# Patient Record
Sex: FEMALE | Race: WHITE | NOT HISPANIC OR LATINO | Employment: UNEMPLOYED | URBAN - NONMETROPOLITAN AREA
[De-identification: names, ages, dates, MRNs, and addresses within clinical notes are randomized per-mention and may not be internally consistent; named-entity substitution may affect disease eponyms.]

---

## 2018-08-17 ENCOUNTER — HOSPITAL ENCOUNTER (EMERGENCY)
Facility: HOSPITAL | Age: 28
Discharge: HOME | End: 2018-08-18
Attending: EMERGENCY MEDICINE | Admitting: EMERGENCY MEDICINE

## 2018-08-17 DIAGNOSIS — N30.00 ACUTE CYSTITIS WITHOUT HEMATURIA: ICD-10-CM

## 2018-08-17 DIAGNOSIS — A59.9 TRICHOMONIASIS: Primary | ICD-10-CM

## 2018-08-17 LAB
B-HCG UR QL: NEGATIVE
BACTERIA UR QL AUTO: ABNORMAL /HPF
BASOPHILS # BLD AUTO: 0.04 10*3/MM3 (ref 0–0.3)
BASOPHILS NFR BLD AUTO: 0.4 % (ref 0–2)
BILIRUB UR QL STRIP: NEGATIVE
CLARITY UR: ABNORMAL
COLOR UR: ABNORMAL
DEPRECATED RDW RBC AUTO: 44.5 FL (ref 37–54)
EOSINOPHIL # BLD AUTO: 0.25 10*3/MM3 (ref 0–0.7)
EOSINOPHIL NFR BLD AUTO: 2.3 % (ref 0–5)
ERYTHROCYTE [DISTWIDTH] IN BLOOD BY AUTOMATED COUNT: 14.1 % (ref 11.5–14.5)
GLUCOSE UR STRIP-MCNC: NEGATIVE MG/DL
HCT VFR BLD AUTO: 39.3 % (ref 37–47)
HGB BLD-MCNC: 12.6 G/DL (ref 12–16)
HGB UR QL STRIP.AUTO: NEGATIVE
HYALINE CASTS UR QL AUTO: ABNORMAL /LPF
IMM GRANULOCYTES # BLD: 0.03 10*3/MM3 (ref 0–0.03)
IMM GRANULOCYTES NFR BLD: 0.3 % (ref 0–0.5)
KETONES UR QL STRIP: NEGATIVE
LEUKOCYTE ESTERASE UR QL STRIP.AUTO: ABNORMAL
LYMPHOCYTES # BLD AUTO: 2.97 10*3/MM3 (ref 1–3)
LYMPHOCYTES NFR BLD AUTO: 27.5 % (ref 21–51)
MCH RBC QN AUTO: 28.4 PG (ref 27–33)
MCHC RBC AUTO-ENTMCNC: 32.1 G/DL (ref 33–37)
MCV RBC AUTO: 88.5 FL (ref 80–94)
MONOCYTES # BLD AUTO: 0.72 10*3/MM3 (ref 0.1–0.9)
MONOCYTES NFR BLD AUTO: 6.7 % (ref 0–10)
NEUTROPHILS # BLD AUTO: 6.79 10*3/MM3 (ref 1.4–6.5)
NEUTROPHILS NFR BLD AUTO: 62.8 % (ref 30–70)
NITRITE UR QL STRIP: NEGATIVE
PH UR STRIP.AUTO: 5.5 [PH] (ref 5–8)
PLATELET # BLD AUTO: 242 10*3/MM3 (ref 130–400)
PMV BLD AUTO: 9.6 FL (ref 6–10)
PROT UR QL STRIP: NEGATIVE
RBC # BLD AUTO: 4.44 10*6/MM3 (ref 4.2–5.4)
RBC # UR: ABNORMAL /HPF
REF LAB TEST METHOD: ABNORMAL
SP GR UR STRIP: 1.03 (ref 1–1.03)
SQUAMOUS #/AREA URNS HPF: ABNORMAL /HPF
UROBILINOGEN UR QL STRIP: ABNORMAL
WBC NRBC COR # BLD: 10.8 10*3/MM3 (ref 4.5–12.5)
WBC UR QL AUTO: ABNORMAL /HPF

## 2018-08-17 PROCEDURE — 81001 URINALYSIS AUTO W/SCOPE: CPT | Performed by: PHYSICIAN ASSISTANT

## 2018-08-17 PROCEDURE — 87086 URINE CULTURE/COLONY COUNT: CPT | Performed by: PHYSICIAN ASSISTANT

## 2018-08-17 PROCEDURE — 36415 COLL VENOUS BLD VENIPUNCTURE: CPT

## 2018-08-17 PROCEDURE — 87220 TISSUE EXAM FOR FUNGI: CPT | Performed by: PHYSICIAN ASSISTANT

## 2018-08-17 PROCEDURE — 85025 COMPLETE CBC W/AUTO DIFF WBC: CPT | Performed by: PHYSICIAN ASSISTANT

## 2018-08-17 PROCEDURE — 99284 EMERGENCY DEPT VISIT MOD MDM: CPT

## 2018-08-17 PROCEDURE — 87210 SMEAR WET MOUNT SALINE/INK: CPT | Performed by: PHYSICIAN ASSISTANT

## 2018-08-17 PROCEDURE — 87491 CHLMYD TRACH DNA AMP PROBE: CPT | Performed by: PHYSICIAN ASSISTANT

## 2018-08-17 PROCEDURE — 87591 N.GONORRHOEAE DNA AMP PROB: CPT | Performed by: PHYSICIAN ASSISTANT

## 2018-08-17 PROCEDURE — 80053 COMPREHEN METABOLIC PANEL: CPT | Performed by: PHYSICIAN ASSISTANT

## 2018-08-17 PROCEDURE — 81025 URINE PREGNANCY TEST: CPT | Performed by: PHYSICIAN ASSISTANT

## 2018-08-18 ENCOUNTER — APPOINTMENT (OUTPATIENT)
Dept: ULTRASOUND IMAGING | Facility: HOSPITAL | Age: 28
End: 2018-08-18

## 2018-08-18 VITALS
TEMPERATURE: 98.1 F | HEART RATE: 86 BPM | BODY MASS INDEX: 19.7 KG/M2 | SYSTOLIC BLOOD PRESSURE: 126 MMHG | OXYGEN SATURATION: 98 % | HEIGHT: 68 IN | RESPIRATION RATE: 18 BRPM | WEIGHT: 130 LBS | DIASTOLIC BLOOD PRESSURE: 69 MMHG

## 2018-08-18 LAB
ALBUMIN SERPL-MCNC: 4.2 G/DL (ref 3.5–5)
ALBUMIN/GLOB SERPL: 1.3 G/DL (ref 1.5–2.5)
ALP SERPL-CCNC: 48 U/L (ref 35–104)
ALT SERPL W P-5'-P-CCNC: 22 U/L (ref 10–36)
ANION GAP SERPL CALCULATED.3IONS-SCNC: 5.9 MMOL/L (ref 3.6–11.2)
AST SERPL-CCNC: 28 U/L (ref 10–30)
BILIRUB SERPL-MCNC: 0.4 MG/DL (ref 0.2–1.8)
BUN BLD-MCNC: 16 MG/DL (ref 7–21)
BUN/CREAT SERPL: 19.5 (ref 7–25)
C TRACH RRNA CVX QL NAA+PROBE: NOT DETECTED
CALCIUM SPEC-SCNC: 9 MG/DL (ref 7.7–10)
CHLORIDE SERPL-SCNC: 105 MMOL/L (ref 99–112)
CO2 SERPL-SCNC: 28.1 MMOL/L (ref 24.3–31.9)
CREAT BLD-MCNC: 0.82 MG/DL (ref 0.43–1.29)
GFR SERPL CREATININE-BSD FRML MDRD: 83 ML/MIN/1.73
GLOBULIN UR ELPH-MCNC: 3.3 GM/DL
GLUCOSE BLD-MCNC: 71 MG/DL (ref 70–110)
HYDATID CYST SPEC WET PREP: ABNORMAL
KOH PREP NAIL: NORMAL
N GONORRHOEA RRNA SPEC QL NAA+PROBE: NOT DETECTED
OSMOLALITY SERPL CALC.SUM OF ELEC: 277.2 MOSM/KG (ref 273–305)
POTASSIUM BLD-SCNC: 4.1 MMOL/L (ref 3.5–5.3)
PROT SERPL-MCNC: 7.5 G/DL (ref 6–8)
SODIUM BLD-SCNC: 139 MMOL/L (ref 135–153)
T VAGINALIS SPEC QL WET PREP: ABNORMAL
WBC SPEC QL WET PREP: ABNORMAL
YEAST GENITAL QL WET PREP: ABNORMAL

## 2018-08-18 PROCEDURE — 96372 THER/PROPH/DIAG INJ SC/IM: CPT

## 2018-08-18 PROCEDURE — 76830 TRANSVAGINAL US NON-OB: CPT | Performed by: RADIOLOGY

## 2018-08-18 PROCEDURE — 25010000002 CEFTRIAXONE PER 250 MG: Performed by: PHYSICIAN ASSISTANT

## 2018-08-18 PROCEDURE — 76830 TRANSVAGINAL US NON-OB: CPT

## 2018-08-18 RX ORDER — METRONIDAZOLE 250 MG/1
2000 TABLET ORAL ONCE
Status: COMPLETED | OUTPATIENT
Start: 2018-08-18 | End: 2018-08-18

## 2018-08-18 RX ORDER — CEFTRIAXONE 1 G/1
1000 INJECTION, POWDER, FOR SOLUTION INTRAMUSCULAR; INTRAVENOUS ONCE
Status: COMPLETED | OUTPATIENT
Start: 2018-08-18 | End: 2018-08-18

## 2018-08-18 RX ORDER — NITROFURANTOIN 25; 75 MG/1; MG/1
100 CAPSULE ORAL 2 TIMES DAILY
Qty: 14 CAPSULE | Refills: 0 | Status: SHIPPED | OUTPATIENT
Start: 2018-08-18 | End: 2018-08-25

## 2018-08-18 RX ORDER — LIDOCAINE HYDROCHLORIDE 10 MG/ML
2.1 INJECTION, SOLUTION EPIDURAL; INFILTRATION; INTRACAUDAL; PERINEURAL ONCE
Status: COMPLETED | OUTPATIENT
Start: 2018-08-18 | End: 2018-08-18

## 2018-08-18 RX ADMIN — METRONIDAZOLE 2000 MG: 250 TABLET ORAL at 01:29

## 2018-08-18 RX ADMIN — LIDOCAINE HYDROCHLORIDE 2.1 ML: 10 INJECTION, SOLUTION EPIDURAL; INFILTRATION; INTRACAUDAL; PERINEURAL at 01:32

## 2018-08-18 RX ADMIN — CEFTRIAXONE 1000 MG: 1 INJECTION, POWDER, FOR SOLUTION INTRAMUSCULAR; INTRAVENOUS at 01:32

## 2018-08-18 NOTE — DISCHARGE INSTRUCTIONS
Please complete your antibiotic regimen and call Dr. Grimm's office on Monday to schedule an appointment to remove malpositioned IUD. Please return to ER if symptoms worsen.

## 2018-08-18 NOTE — ED PROVIDER NOTES
Subjective   Patient says vaginal bleeding started yesterday and that she felt the plastic part of her IUD and pushed it back into her vagina.         Vaginal Discharge   Quality:  Bloody and mucoid  Severity:  Moderate  Onset quality:  Gradual  Duration:  1 day  Timing:  Intermittent  Progression:  Waxing and waning  Chronicity:  New  Context: during urination and spontaneously    Context: not after intercourse, not after urination, not at rest, not during bowel movement, not during intercourse, not during pregnancy, not genital trauma and not recent antibiotic use    Relieved by:  None tried  Worsened by:  Nothing  Ineffective treatments:  None tried  Associated symptoms: abdominal pain    Associated symptoms: no dyspareunia, no dysuria, no fever, no genital lesions, no nausea, no rash, no urinary frequency, no urinary hesitancy, no urinary incontinence, no vaginal itching and no vomiting    Risk factors: no endometriosis, no foreign body, no gynecological surgery, no immunosuppression, no new sexual partner, no PID, no prior miscarriage, no STI, no STI exposure, no terminated pregnancy and no unprotected sex        Review of Systems   Constitutional: Negative.  Negative for fever.   HENT: Negative.    Respiratory: Negative.    Cardiovascular: Negative.  Negative for chest pain.   Gastrointestinal: Positive for abdominal pain. Negative for abdominal distention, anal bleeding, blood in stool, constipation, diarrhea, nausea, rectal pain and vomiting.   Endocrine: Negative.    Genitourinary: Positive for vaginal bleeding and vaginal discharge. Negative for bladder incontinence, decreased urine volume, difficulty urinating, dyspareunia, dysuria, enuresis, flank pain, frequency, genital sores, hematuria, hesitancy, menstrual problem, pelvic pain, urgency and vaginal pain.   Skin: Negative.    Neurological: Negative.    Psychiatric/Behavioral: Negative.    All other systems reviewed and are negative.      Past Medical  History:   Diagnosis Date   • Lupus    • Substance abuse        No Known Allergies    Past Surgical History:   Procedure Laterality Date   • ADENOIDECTOMY     • APPENDECTOMY     • CHOLECYSTECTOMY     • TONSILLECTOMY     • TUBAL ABDOMINAL LIGATION         No family history on file.    Social History     Social History   • Marital status:      Social History Main Topics   • Smoking status: Current Every Day Smoker     Packs/day: 3.00   • Alcohol use No   • Drug use: No     Other Topics Concern   • Not on file           Objective   Physical Exam   Constitutional: She is oriented to person, place, and time. She appears well-developed and well-nourished. No distress.   HENT:   Head: Normocephalic and atraumatic.   Right Ear: External ear normal.   Left Ear: External ear normal.   Nose: Nose normal.   Eyes: Pupils are equal, round, and reactive to light. Conjunctivae and EOM are normal.   Neck: Normal range of motion. Neck supple. No JVD present. No tracheal deviation present.   Cardiovascular: Normal rate, regular rhythm and normal heart sounds.  Exam reveals no gallop and no friction rub.    No murmur heard.  Pulmonary/Chest: Effort normal and breath sounds normal. No respiratory distress. She has no wheezes. She has no rales. She exhibits no tenderness.   Abdominal: Soft. Bowel sounds are normal. She exhibits no distension and no mass. There is no tenderness. There is no rebound and no guarding. No hernia.   Genitourinary: Uterus normal. Vaginal discharge found.   Genitourinary Comments: Thick, white vaginal discharge noted. No bleeding. IUD not seen.    Musculoskeletal: Normal range of motion. She exhibits no edema or deformity.   Neurological: She is alert and oriented to person, place, and time. No cranial nerve deficit.   Skin: Skin is warm and dry. No rash noted. She is not diaphoretic. No erythema. No pallor.   Psychiatric: She has a normal mood and affect. Her behavior is normal. Thought content normal.    Nursing note and vitals reviewed.      Procedures           ED Course  ED Course as of Aug 18 0145   Sat Aug 18, 2018   0130 IUD malpositioning with endocervical region and partially embedded within uterine body with no gross penetration through uterine wall; right ovarian cyst per [facility] report.  US Non-ob Transvaginal [MM]   0141 Patient diagnosed with malpositioned IUD, trichomoniasis, and UTI. Will be d/c home with rx for macrobid and instructions to f/u with GYN on Monday to have IUD removed. Will return to ER if symptoms worsen.   [MM]      ED Course User Index  [MM] Aurea Franklin PA                  MDM  Number of Diagnoses or Management Options  Acute cystitis without hematuria:   Trichomoniasis:      Amount and/or Complexity of Data Reviewed  Clinical lab tests: ordered and reviewed  Tests in the radiology section of CPT®: ordered and reviewed  Discuss the patient with other providers: yes          Final diagnoses:   Trichomoniasis   Acute cystitis without hematuria            Aurea Franklin PA  08/18/18 0145

## 2018-08-20 LAB — BACTERIA SPEC AEROBE CULT: NORMAL

## 2020-02-11 PROCEDURE — 99283 EMERGENCY DEPT VISIT LOW MDM: CPT

## 2020-02-12 ENCOUNTER — HOSPITAL ENCOUNTER (EMERGENCY)
Facility: HOSPITAL | Age: 30
Discharge: HOME | End: 2020-02-12
Attending: EMERGENCY MEDICINE | Admitting: EMERGENCY MEDICINE

## 2020-02-12 ENCOUNTER — APPOINTMENT (OUTPATIENT)
Dept: ULTRASOUND IMAGING | Facility: HOSPITAL | Age: 30
End: 2020-02-12

## 2020-02-12 VITALS
HEART RATE: 72 BPM | TEMPERATURE: 97.8 F | WEIGHT: 172.2 LBS | HEIGHT: 68 IN | DIASTOLIC BLOOD PRESSURE: 90 MMHG | SYSTOLIC BLOOD PRESSURE: 128 MMHG | RESPIRATION RATE: 18 BRPM | OXYGEN SATURATION: 100 % | BODY MASS INDEX: 26.1 KG/M2

## 2020-02-12 DIAGNOSIS — R10.9 ABDOMINAL PAIN DURING PREGNANCY IN FIRST TRIMESTER: Primary | ICD-10-CM

## 2020-02-12 DIAGNOSIS — O26.891 ABDOMINAL PAIN DURING PREGNANCY IN FIRST TRIMESTER: Primary | ICD-10-CM

## 2020-02-12 LAB
6-ACETYL MORPHINE: NEGATIVE
ALBUMIN SERPL-MCNC: 3.87 G/DL (ref 3.5–5.2)
ALBUMIN/GLOB SERPL: 1.1 G/DL
ALP SERPL-CCNC: 48 U/L (ref 39–117)
ALT SERPL W P-5'-P-CCNC: 12 U/L (ref 1–33)
AMPHET+METHAMPHET UR QL: NEGATIVE
ANION GAP SERPL CALCULATED.3IONS-SCNC: 11.5 MMOL/L (ref 5–15)
AST SERPL-CCNC: 16 U/L (ref 1–32)
BACTERIA UR QL AUTO: ABNORMAL /HPF
BARBITURATES UR QL SCN: NEGATIVE
BASOPHILS # BLD AUTO: 0.04 10*3/MM3 (ref 0–0.2)
BASOPHILS NFR BLD AUTO: 0.4 % (ref 0–1.5)
BENZODIAZ UR QL SCN: NEGATIVE
BILIRUB SERPL-MCNC: 0.2 MG/DL (ref 0.2–1.2)
BILIRUB UR QL STRIP: NEGATIVE
BUN BLD-MCNC: 8 MG/DL (ref 6–20)
BUN/CREAT SERPL: 15.1 (ref 7–25)
BUPRENORPHINE SERPL-MCNC: NEGATIVE NG/ML
CALCIUM SPEC-SCNC: 9 MG/DL (ref 8.6–10.5)
CANNABINOIDS SERPL QL: NEGATIVE
CHLORIDE SERPL-SCNC: 98 MMOL/L (ref 98–107)
CLARITY UR: CLEAR
CO2 SERPL-SCNC: 24.5 MMOL/L (ref 22–29)
COCAINE UR QL: NEGATIVE
COLOR UR: YELLOW
CREAT BLD-MCNC: 0.53 MG/DL (ref 0.57–1)
DEPRECATED RDW RBC AUTO: 44.8 FL (ref 37–54)
EOSINOPHIL # BLD AUTO: 0 10*3/MM3 (ref 0–0.4)
EOSINOPHIL NFR BLD AUTO: 0 % (ref 0.3–6.2)
ERYTHROCYTE [DISTWIDTH] IN BLOOD BY AUTOMATED COUNT: 13.8 % (ref 12.3–15.4)
GFR SERPL CREATININE-BSD FRML MDRD: 136 ML/MIN/1.73
GLOBULIN UR ELPH-MCNC: 3.6 GM/DL
GLUCOSE BLD-MCNC: 94 MG/DL (ref 65–99)
GLUCOSE UR STRIP-MCNC: NEGATIVE MG/DL
HCG INTACT+B SERPL-ACNC: NORMAL MIU/ML
HCT VFR BLD AUTO: 38.2 % (ref 34–46.6)
HGB BLD-MCNC: 12.2 G/DL (ref 12–15.9)
HGB UR QL STRIP.AUTO: NEGATIVE
HOLD SPECIMEN: NORMAL
HOLD SPECIMEN: NORMAL
HYALINE CASTS UR QL AUTO: ABNORMAL /LPF
IMM GRANULOCYTES # BLD AUTO: 0.07 10*3/MM3 (ref 0–0.05)
IMM GRANULOCYTES NFR BLD AUTO: 0.7 % (ref 0–0.5)
KETONES UR QL STRIP: NEGATIVE
LEUKOCYTE ESTERASE UR QL STRIP.AUTO: ABNORMAL
LIPASE SERPL-CCNC: 56 U/L (ref 13–60)
LYMPHOCYTES # BLD AUTO: 2.02 10*3/MM3 (ref 0.7–3.1)
LYMPHOCYTES NFR BLD AUTO: 20.9 % (ref 19.6–45.3)
MCH RBC QN AUTO: 28.2 PG (ref 26.6–33)
MCHC RBC AUTO-ENTMCNC: 31.9 G/DL (ref 31.5–35.7)
MCV RBC AUTO: 88.2 FL (ref 79–97)
METHADONE UR QL SCN: NEGATIVE
MONOCYTES # BLD AUTO: 0.6 10*3/MM3 (ref 0.1–0.9)
MONOCYTES NFR BLD AUTO: 6.2 % (ref 5–12)
NEUTROPHILS # BLD AUTO: 6.92 10*3/MM3 (ref 1.7–7)
NEUTROPHILS NFR BLD AUTO: 71.8 % (ref 42.7–76)
NITRITE UR QL STRIP: NEGATIVE
NRBC BLD AUTO-RTO: 0 /100 WBC (ref 0–0.2)
OPIATES UR QL: NEGATIVE
OXYCODONE UR QL SCN: NEGATIVE
PCP UR QL SCN: NEGATIVE
PH UR STRIP.AUTO: 7 [PH] (ref 5–8)
PLATELET # BLD AUTO: 269 10*3/MM3 (ref 140–450)
PMV BLD AUTO: 9.6 FL (ref 6–12)
POTASSIUM BLD-SCNC: 3.7 MMOL/L (ref 3.5–5.2)
PROT SERPL-MCNC: 7.5 G/DL (ref 6–8.5)
PROT UR QL STRIP: NEGATIVE
RBC # BLD AUTO: 4.33 10*6/MM3 (ref 3.77–5.28)
RBC # UR: ABNORMAL /HPF
REF LAB TEST METHOD: ABNORMAL
SODIUM BLD-SCNC: 134 MMOL/L (ref 136–145)
SP GR UR STRIP: <=1.005 (ref 1–1.03)
SQUAMOUS #/AREA URNS HPF: ABNORMAL /HPF
UROBILINOGEN UR QL STRIP: ABNORMAL
WBC NRBC COR # BLD: 9.65 10*3/MM3 (ref 3.4–10.8)
WBC UR QL AUTO: ABNORMAL /HPF
WHOLE BLOOD HOLD SPECIMEN: NORMAL
WHOLE BLOOD HOLD SPECIMEN: NORMAL

## 2020-02-12 PROCEDURE — 84702 CHORIONIC GONADOTROPIN TEST: CPT | Performed by: PHYSICIAN ASSISTANT

## 2020-02-12 PROCEDURE — 81001 URINALYSIS AUTO W/SCOPE: CPT | Performed by: PHYSICIAN ASSISTANT

## 2020-02-12 PROCEDURE — 80307 DRUG TEST PRSMV CHEM ANLYZR: CPT | Performed by: PHYSICIAN ASSISTANT

## 2020-02-12 PROCEDURE — 76801 OB US < 14 WKS SINGLE FETUS: CPT

## 2020-02-12 PROCEDURE — 83690 ASSAY OF LIPASE: CPT | Performed by: PHYSICIAN ASSISTANT

## 2020-02-12 PROCEDURE — 80053 COMPREHEN METABOLIC PANEL: CPT | Performed by: PHYSICIAN ASSISTANT

## 2020-02-12 PROCEDURE — 85025 COMPLETE CBC W/AUTO DIFF WBC: CPT | Performed by: PHYSICIAN ASSISTANT

## 2020-02-12 RX ORDER — PNV NO.95/FERROUS FUM/FOLIC AC 28MG-0.8MG
1 TABLET ORAL DAILY
Qty: 30 TABLET | Refills: 0 | Status: SHIPPED | OUTPATIENT
Start: 2020-02-12 | End: 2020-03-13

## 2020-02-12 RX ORDER — SODIUM CHLORIDE 0.9 % (FLUSH) 0.9 %
10 SYRINGE (ML) INJECTION AS NEEDED
Status: DISCONTINUED | OUTPATIENT
Start: 2020-02-12 | End: 2020-02-12 | Stop reason: HOSPADM

## 2020-02-12 NOTE — ED NOTES
Pt noted to be eating strawberries in the lobby and drinking a beverage      Yemi Shaw  02/12/20 0005

## 2020-02-12 NOTE — ED PROVIDER NOTES
Subjective   30 y/o female pt presents to the ED with complaints of abdominal pain. Patient states that she has no idea how far along she is. States that she had her Mirena taken out recently.  Patient states she is having severe lower abd pain and tightness. Reports nausea and vomiting. Denies dhara fevers. States that this has been going on for 5 hours. Experiencing increased anxiety.        History provided by:  Patient   used: No        Review of Systems   Constitutional: Negative.    HENT: Negative.    Eyes: Negative.    Respiratory: Negative.    Cardiovascular: Negative.    Gastrointestinal: Positive for abdominal pain, nausea and vomiting.   Endocrine: Negative.    Genitourinary: Negative.    Musculoskeletal: Negative.    Skin: Negative.    Allergic/Immunologic: Negative.    Neurological: Negative.    Hematological: Negative.    Psychiatric/Behavioral: The patient is nervous/anxious.    All other systems reviewed and are negative.      Past Medical History:   Diagnosis Date   • Lupus (CMS/[facility])    • Substance abuse (CMS/[facility])        No Known Allergies    Past Surgical History:   Procedure Laterality Date   • ADENOIDECTOMY     • APPENDECTOMY     • CHOLECYSTECTOMY     • TONSILLECTOMY     • TUBAL ABDOMINAL LIGATION         No family history on file.    Social History     Socioeconomic History   • Marital status:      Spouse name: Not on file   • Number of children: Not on file   • Years of education: Not on file   • Highest education level: Not on file   Tobacco Use   • Smoking status: Current Every Day Smoker     Packs/day: 3.00   Substance and Sexual Activity   • Alcohol use: No   • Drug use: No           Objective   Physical Exam   Constitutional: She is oriented to person, place, and time. She appears well-developed and well-nourished. No distress.   HENT:   Head: Normocephalic and atraumatic.   Right Ear: External ear normal.   Left Ear: External ear normal.   Nose: Nose normal.    Mouth/Throat: Oropharynx is clear and moist.   Eyes: Pupils are equal, round, and reactive to light. Conjunctivae and EOM are normal.   Neck: Normal range of motion. Neck supple.   Cardiovascular: Normal rate, regular rhythm, normal heart sounds and intact distal pulses.   Pulmonary/Chest: Effort normal and breath sounds normal.   Abdominal: Soft. Bowel sounds are normal. She exhibits no distension and no mass. There is no tenderness. There is no rebound and no guarding. No hernia.   Musculoskeletal: Normal range of motion.   Neurological: She is alert and oriented to person, place, and time.   Skin: Skin is warm and dry. Capillary refill takes less than 2 seconds. She is not diaphoretic.   Psychiatric: She has a normal mood and affect. Her behavior is normal. Judgment and thought content normal.   Nursing note and vitals reviewed.      Procedures           ED Course  ED Course as of Feb 12 0246   Wed Feb 12, 2020   0207 IMPRESSION:     1. Single living intrauterine pregnancy with an estimated gestational age of approximately 11 weeks and 3 days.  2. Neither ovary visualized.      Signer Name: Jimbo Dill MD   Signed: 2/12/2020 2:01 AM   Workstation Name: [id number]    [facility] Ob < 14 Weeks Single or First Gestation [ML]   0245 Pt instructed to f/u with OBGYN and start prenatal vitamins. Discussed sx that would warrant return to the ED.     [ML]      ED Course User Index  [ML] Mague Johansen PA                                           MDM  Number of Diagnoses or Management Options  Abdominal pain during pregnancy in first trimester:      Amount and/or Complexity of Data Reviewed  Clinical lab tests: ordered and reviewed  Tests in the radiology section of CPT®: ordered and reviewed    Risk of Complications, Morbidity, and/or Mortality  Presenting problems: low  Diagnostic procedures: low  Management options: minimal    Patient Progress  Patient progress: improved      Final  diagnoses:   Abdominal pain during pregnancy in first trimester            Mague Johansen PA  02/12/20 2627

## 2020-02-12 NOTE — ED NOTES
Patient says she is unable to give urine specimen at this time due to recently voiding urine before she came back to her room.     Cody Grover  02/12/20 0022

## 2020-07-10 LAB
EXTERNAL ABO GROUPING: NORMAL
EXTERNAL ANTIBODY SCREEN: NEGATIVE
EXTERNAL CHLAMYDIA SCREEN: NEGATIVE
EXTERNAL GONORRHEA SCREEN: NEGATIVE
EXTERNAL HEPATITIS B SURFACE ANTIGEN: NEGATIVE
EXTERNAL HEPATITIS C AB: NORMAL
EXTERNAL RH FACTOR: POSITIVE
EXTERNAL RUBELLA QUALITATIVE: NORMAL
EXTERNAL SYPHILIS RPR SCREEN: NORMAL
HIV1 P24 AG SERPL QL IA: NORMAL

## 2020-08-22 ENCOUNTER — HOSPITAL ENCOUNTER (INPATIENT)
Facility: HOSPITAL | Age: 30
LOS: 2 days | Discharge: HOME | End: 2020-08-24
Attending: OBSTETRICS & GYNECOLOGY | Admitting: OBSTETRICS & GYNECOLOGY

## 2020-08-22 ENCOUNTER — ANESTHESIA (OUTPATIENT)
Dept: LABOR AND DELIVERY | Facility: HOSPITAL | Age: 30
End: 2020-08-22

## 2020-08-22 ENCOUNTER — ANESTHESIA EVENT (OUTPATIENT)
Dept: LABOR AND DELIVERY | Facility: HOSPITAL | Age: 30
End: 2020-08-22

## 2020-08-22 PROBLEM — Z34.90 PREGNANT: Status: RESOLVED | Noted: 2020-08-22 | Resolved: 2020-08-22

## 2020-08-22 PROBLEM — Z34.90 PREGNANT: Status: ACTIVE | Noted: 2020-08-22

## 2020-08-22 LAB
ABO GROUP BLD: NORMAL
AMPHET+METHAMPHET UR QL: NEGATIVE
BARBITURATES UR QL SCN: NEGATIVE
BENZODIAZ UR QL SCN: NEGATIVE
BLD GP AB SCN SERPL QL: NEGATIVE
CANNABINOIDS SERPL QL: NEGATIVE
COCAINE UR QL: NEGATIVE
DEPRECATED RDW RBC AUTO: 48.5 FL (ref 37–54)
ERYTHROCYTE [DISTWIDTH] IN BLOOD BY AUTOMATED COUNT: 15.5 % (ref 12.3–15.4)
HCT VFR BLD AUTO: 27.7 % (ref 34–46.6)
HGB BLD-MCNC: 8.4 G/DL (ref 12–15.9)
MCH RBC QN AUTO: 25.9 PG (ref 26.6–33)
MCHC RBC AUTO-ENTMCNC: 30.3 G/DL (ref 31.5–35.7)
MCV RBC AUTO: 85.5 FL (ref 79–97)
METHADONE UR QL SCN: NEGATIVE
OPIATES UR QL: NEGATIVE
OXYCODONE UR QL SCN: NEGATIVE
PLATELET # BLD AUTO: 270 10*3/MM3 (ref 140–450)
PMV BLD AUTO: 10.3 FL (ref 6–12)
RBC # BLD AUTO: 3.24 10*6/MM3 (ref 3.77–5.28)
RH BLD: POSITIVE
SARS-COV-2 RDRP RESP QL NAA+PROBE: NOT DETECTED
T&S EXPIRATION DATE: NORMAL
WBC # BLD AUTO: 11.61 10*3/MM3 (ref 3.4–10.8)

## 2020-08-22 PROCEDURE — 86901 BLOOD TYPING SEROLOGIC RH(D): CPT | Performed by: OBSTETRICS & GYNECOLOGY

## 2020-08-22 PROCEDURE — 25010000003 LIDOCAINE 1 % SOLUTION: Performed by: NURSE ANESTHETIST, CERTIFIED REGISTERED

## 2020-08-22 PROCEDURE — 59025 FETAL NON-STRESS TEST: CPT

## 2020-08-22 PROCEDURE — 25010000002 BUTORPHANOL PER 1 MG: Performed by: OBSTETRICS & GYNECOLOGY

## 2020-08-22 PROCEDURE — 80307 DRUG TEST PRSMV CHEM ANLYZR: CPT | Performed by: OBSTETRICS & GYNECOLOGY

## 2020-08-22 PROCEDURE — C1755 CATHETER, INTRASPINAL: HCPCS

## 2020-08-22 PROCEDURE — 25010000002 FENTANYL CITRATE (PF) 100 MCG/2ML SOLUTION: Performed by: NURSE ANESTHETIST, CERTIFIED REGISTERED

## 2020-08-22 PROCEDURE — 25010000003 AMPICILLIN PER 500 MG: Performed by: OBSTETRICS & GYNECOLOGY

## 2020-08-22 PROCEDURE — C1755 CATHETER, INTRASPINAL: HCPCS | Performed by: NURSE ANESTHETIST, CERTIFIED REGISTERED

## 2020-08-22 PROCEDURE — G0463 HOSPITAL OUTPT CLINIC VISIT: HCPCS

## 2020-08-22 PROCEDURE — 25010000002 AMPICILLIN PER 500 MG: Performed by: OBSTETRICS & GYNECOLOGY

## 2020-08-22 PROCEDURE — 87635 SARS-COV-2 COVID-19 AMP PRB: CPT | Performed by: OBSTETRICS & GYNECOLOGY

## 2020-08-22 PROCEDURE — 86850 RBC ANTIBODY SCREEN: CPT | Performed by: OBSTETRICS & GYNECOLOGY

## 2020-08-22 PROCEDURE — 86900 BLOOD TYPING SEROLOGIC ABO: CPT | Performed by: OBSTETRICS & GYNECOLOGY

## 2020-08-22 PROCEDURE — 25010000002 ROPIVACAINE PER 1 MG: Performed by: NURSE ANESTHETIST, CERTIFIED REGISTERED

## 2020-08-22 PROCEDURE — 85027 COMPLETE CBC AUTOMATED: CPT | Performed by: OBSTETRICS & GYNECOLOGY

## 2020-08-22 RX ORDER — ONDANSETRON 2 MG/ML
4 INJECTION INTRAMUSCULAR; INTRAVENOUS ONCE AS NEEDED
Status: DISCONTINUED | OUTPATIENT
Start: 2020-08-22 | End: 2020-08-22

## 2020-08-22 RX ORDER — SODIUM CHLORIDE, SODIUM LACTATE, POTASSIUM CHLORIDE, CALCIUM CHLORIDE 600; 310; 30; 20 MG/100ML; MG/100ML; MG/100ML; MG/100ML
125 INJECTION, SOLUTION INTRAVENOUS CONTINUOUS
Status: DISCONTINUED | OUTPATIENT
Start: 2020-08-22 | End: 2020-08-22

## 2020-08-22 RX ORDER — CETIRIZINE HYDROCHLORIDE 10 MG/1
10 TABLET ORAL DAILY PRN
Status: CANCELLED | OUTPATIENT
Start: 2020-08-22

## 2020-08-22 RX ORDER — DOCUSATE SODIUM 100 MG/1
100 CAPSULE, LIQUID FILLED ORAL 2 TIMES DAILY
Status: DISCONTINUED | OUTPATIENT
Start: 2020-08-22 | End: 2020-08-24 | Stop reason: HOSPADM

## 2020-08-22 RX ORDER — PSEUDOEPHEDRINE HCL 30 MG
30 TABLET ORAL EVERY 6 HOURS PRN
Status: ON HOLD | COMMUNITY
End: 2020-08-22

## 2020-08-22 RX ORDER — HYDROCORTISONE ACETATE PRAMOXINE HCL 1; 1 G/100G; G/100G
1 CREAM TOPICAL AS NEEDED
Status: DISCONTINUED | OUTPATIENT
Start: 2020-08-22 | End: 2020-08-24 | Stop reason: HOSPADM

## 2020-08-22 RX ORDER — HYDROCORTISONE 25 MG/G
1 CREAM TOPICAL AS NEEDED
Status: DISCONTINUED | OUTPATIENT
Start: 2020-08-22 | End: 2020-08-24 | Stop reason: HOSPADM

## 2020-08-22 RX ORDER — FAMOTIDINE 10 MG/ML
20 INJECTION, SOLUTION INTRAVENOUS ONCE AS NEEDED
Status: DISCONTINUED | OUTPATIENT
Start: 2020-08-22 | End: 2020-08-22

## 2020-08-22 RX ORDER — LIDOCAINE HYDROCHLORIDE 20 MG/ML
INJECTION, SOLUTION EPIDURAL; INFILTRATION; INTRACAUDAL; PERINEURAL
Status: COMPLETED
Start: 2020-08-22 | End: 2020-08-22

## 2020-08-22 RX ORDER — ROPIVACAINE HYDROCHLORIDE 2 MG/ML
INJECTION, SOLUTION EPIDURAL; INFILTRATION; PERINEURAL
Status: COMPLETED
Start: 2020-08-22 | End: 2020-08-22

## 2020-08-22 RX ORDER — BUTORPHANOL TARTRATE 1 MG/ML
1 INJECTION, SOLUTION INTRAMUSCULAR; INTRAVENOUS
Status: DISCONTINUED | OUTPATIENT
Start: 2020-08-22 | End: 2020-08-22

## 2020-08-22 RX ORDER — OXYTOCIN-SODIUM CHLORIDE 0.9% IV SOLN 30 UNIT/500ML 30-0.9/5 UT/ML-%
125 SOLUTION INTRAVENOUS CONTINUOUS PRN
Status: DISCONTINUED | OUTPATIENT
Start: 2020-08-22 | End: 2020-08-22 | Stop reason: HOSPADM

## 2020-08-22 RX ORDER — OXYTOCIN-SODIUM CHLORIDE 0.9% IV SOLN 30 UNIT/500ML 30-0.9/5 UT/ML-%
2-20 SOLUTION INTRAVENOUS
Status: DISCONTINUED | OUTPATIENT
Start: 2020-08-22 | End: 2020-08-22

## 2020-08-22 RX ORDER — PROMETHAZINE HYDROCHLORIDE 12.5 MG/1
12.5 SUPPOSITORY RECTAL EVERY 6 HOURS PRN
Status: DISCONTINUED | OUTPATIENT
Start: 2020-08-22 | End: 2020-08-22

## 2020-08-22 RX ORDER — OXYTOCIN-SODIUM CHLORIDE 0.9% IV SOLN 30 UNIT/500ML 30-0.9/5 UT/ML-%
250 SOLUTION INTRAVENOUS CONTINUOUS
Status: ACTIVE | OUTPATIENT
Start: 2020-08-22 | End: 2020-08-22

## 2020-08-22 RX ORDER — ZOLPIDEM TARTRATE 5 MG/1
5 TABLET ORAL NIGHTLY PRN
Status: DISCONTINUED | OUTPATIENT
Start: 2020-08-22 | End: 2020-08-22

## 2020-08-22 RX ORDER — ONDANSETRON 4 MG/1
4 TABLET, FILM COATED ORAL EVERY 8 HOURS PRN
Status: DISCONTINUED | OUTPATIENT
Start: 2020-08-22 | End: 2020-08-24 | Stop reason: HOSPADM

## 2020-08-22 RX ORDER — MISOPROSTOL 100 UG/1
800 TABLET ORAL AS NEEDED
Status: DISCONTINUED | OUTPATIENT
Start: 2020-08-22 | End: 2020-08-22 | Stop reason: HOSPADM

## 2020-08-22 RX ORDER — LIDOCAINE HYDROCHLORIDE 10 MG/ML
5 INJECTION, SOLUTION EPIDURAL; INFILTRATION; INTRACAUDAL; PERINEURAL AS NEEDED
Status: DISCONTINUED | OUTPATIENT
Start: 2020-08-22 | End: 2020-08-22

## 2020-08-22 RX ORDER — PROMETHAZINE HYDROCHLORIDE 25 MG/1
12.5 TABLET ORAL EVERY 6 HOURS PRN
Status: DISCONTINUED | OUTPATIENT
Start: 2020-08-22 | End: 2020-08-22

## 2020-08-22 RX ORDER — ONDANSETRON 4 MG/1
4 TABLET, FILM COATED ORAL EVERY 6 HOURS PRN
Status: DISCONTINUED | OUTPATIENT
Start: 2020-08-22 | End: 2020-08-22

## 2020-08-22 RX ORDER — TERBUTALINE SULFATE 1 MG/ML
0.25 INJECTION, SOLUTION SUBCUTANEOUS AS NEEDED
Status: DISCONTINUED | OUTPATIENT
Start: 2020-08-22 | End: 2020-08-22

## 2020-08-22 RX ORDER — EPHEDRINE SULFATE 50 MG/ML
10 INJECTION, SOLUTION INTRAVENOUS
Status: DISCONTINUED | OUTPATIENT
Start: 2020-08-22 | End: 2020-08-22

## 2020-08-22 RX ORDER — SODIUM CHLORIDE 0.9 % (FLUSH) 0.9 %
3-10 SYRINGE (ML) INJECTION AS NEEDED
Status: DISCONTINUED | OUTPATIENT
Start: 2020-08-22 | End: 2020-08-22

## 2020-08-22 RX ORDER — CETIRIZINE HYDROCHLORIDE 10 MG/1
10 TABLET ORAL DAILY
Status: DISCONTINUED | OUTPATIENT
Start: 2020-08-22 | End: 2020-08-24 | Stop reason: HOSPADM

## 2020-08-22 RX ORDER — HYDROCODONE BITARTRATE AND ACETAMINOPHEN 5; 325 MG/1; MG/1
1 TABLET ORAL EVERY 4 HOURS PRN
Status: DISCONTINUED | OUTPATIENT
Start: 2020-08-22 | End: 2020-08-24 | Stop reason: HOSPADM

## 2020-08-22 RX ORDER — EPHEDRINE SULFATE 50 MG/ML
INJECTION INTRAVENOUS
Status: DISCONTINUED
Start: 2020-08-22 | End: 2020-08-22 | Stop reason: WASHOUT

## 2020-08-22 RX ORDER — ROPIVACAINE HYDROCHLORIDE 2 MG/ML
14 INJECTION, SOLUTION EPIDURAL; INFILTRATION; PERINEURAL CONTINUOUS
Status: DISCONTINUED | OUTPATIENT
Start: 2020-08-22 | End: 2020-08-22

## 2020-08-22 RX ORDER — LIDOCAINE HYDROCHLORIDE 10 MG/ML
INJECTION, SOLUTION INFILTRATION; PERINEURAL AS NEEDED
Status: DISCONTINUED | OUTPATIENT
Start: 2020-08-22 | End: 2020-10-20 | Stop reason: SURG

## 2020-08-22 RX ORDER — ONDANSETRON 2 MG/ML
4 INJECTION INTRAMUSCULAR; INTRAVENOUS EVERY 6 HOURS PRN
Status: DISCONTINUED | OUTPATIENT
Start: 2020-08-22 | End: 2020-08-22

## 2020-08-22 RX ORDER — CARBOPROST TROMETHAMINE 250 UG/ML
250 INJECTION, SOLUTION INTRAMUSCULAR AS NEEDED
Status: DISCONTINUED | OUTPATIENT
Start: 2020-08-22 | End: 2020-08-22 | Stop reason: HOSPADM

## 2020-08-22 RX ORDER — FENTANYL CITRATE 50 UG/ML
INJECTION, SOLUTION INTRAMUSCULAR; INTRAVENOUS
Status: COMPLETED
Start: 2020-08-22 | End: 2020-08-22

## 2020-08-22 RX ORDER — BISACODYL 10 MG
10 SUPPOSITORY, RECTAL RECTAL DAILY PRN
Status: DISCONTINUED | OUTPATIENT
Start: 2020-08-23 | End: 2020-08-24 | Stop reason: HOSPADM

## 2020-08-22 RX ORDER — ACETAMINOPHEN 325 MG/1
650 TABLET ORAL EVERY 4 HOURS PRN
Status: DISCONTINUED | OUTPATIENT
Start: 2020-08-22 | End: 2020-08-22

## 2020-08-22 RX ORDER — UREA 10 %
1 LOTION (ML) TOPICAL DAILY
Status: CANCELLED | OUTPATIENT
Start: 2020-08-22

## 2020-08-22 RX ORDER — IBUPROFEN 800 MG/1
800 TABLET ORAL 3 TIMES DAILY
Status: DISCONTINUED | OUTPATIENT
Start: 2020-08-22 | End: 2020-08-24 | Stop reason: HOSPADM

## 2020-08-22 RX ORDER — MULTIPLE VITAMINS W/ MINERALS TAB 9MG-400MCG
1 TAB ORAL DAILY
Status: ON HOLD | COMMUNITY
End: 2020-08-22

## 2020-08-22 RX ORDER — MINERAL OIL
OIL (ML) MISCELLANEOUS ONCE
Status: DISCONTINUED | OUTPATIENT
Start: 2020-08-22 | End: 2020-08-22

## 2020-08-22 RX ORDER — LIDOCAINE HYDROCHLORIDE AND EPINEPHRINE 15; 5 MG/ML; UG/ML
INJECTION, SOLUTION EPIDURAL AS NEEDED
Status: DISCONTINUED | OUTPATIENT
Start: 2020-08-22 | End: 2020-10-20 | Stop reason: SURG

## 2020-08-22 RX ORDER — FENTANYL CITRATE 50 UG/ML
INJECTION, SOLUTION INTRAMUSCULAR; INTRAVENOUS AS NEEDED
Status: DISCONTINUED | OUTPATIENT
Start: 2020-08-22 | End: 2020-10-20 | Stop reason: SURG

## 2020-08-22 RX ORDER — LORATADINE 10 MG/1
1 CAPSULE, LIQUID FILLED ORAL DAILY PRN
Status: ON HOLD | COMMUNITY
End: 2021-11-28

## 2020-08-22 RX ORDER — SODIUM CHLORIDE 0.9 % (FLUSH) 0.9 %
3 SYRINGE (ML) INJECTION EVERY 12 HOURS SCHEDULED
Status: DISCONTINUED | OUTPATIENT
Start: 2020-08-22 | End: 2020-08-22

## 2020-08-22 RX ORDER — SODIUM CHLORIDE 0.9 % (FLUSH) 0.9 %
1-10 SYRINGE (ML) INJECTION AS NEEDED
Status: DISCONTINUED | OUTPATIENT
Start: 2020-08-22 | End: 2020-08-24 | Stop reason: HOSPADM

## 2020-08-22 RX ORDER — LIDOCAINE HYDROCHLORIDE 20 MG/ML
INJECTION, SOLUTION EPIDURAL; INFILTRATION; INTRACAUDAL; PERINEURAL AS NEEDED
Status: DISCONTINUED | OUTPATIENT
Start: 2020-08-22 | End: 2020-10-20 | Stop reason: SURG

## 2020-08-22 RX ORDER — PROMETHAZINE HYDROCHLORIDE 25 MG/ML
12.5 INJECTION, SOLUTION INTRAMUSCULAR; INTRAVENOUS EVERY 6 HOURS PRN
Status: DISCONTINUED | OUTPATIENT
Start: 2020-08-22 | End: 2020-08-22

## 2020-08-22 RX ORDER — OXYTOCIN-SODIUM CHLORIDE 0.9% IV SOLN 30 UNIT/500ML 30-0.9/5 UT/ML-%
999 SOLUTION INTRAVENOUS ONCE
Status: DISCONTINUED | OUTPATIENT
Start: 2020-08-22 | End: 2020-08-22 | Stop reason: HOSPADM

## 2020-08-22 RX ORDER — METHYLERGONOVINE MALEATE 0.2 MG/ML
200 INJECTION INTRAVENOUS ONCE AS NEEDED
Status: DISCONTINUED | OUTPATIENT
Start: 2020-08-22 | End: 2020-08-22 | Stop reason: HOSPADM

## 2020-08-22 RX ORDER — PSEUDOEPHEDRINE HCL 30 MG
30 TABLET ORAL EVERY 6 HOURS PRN
Status: CANCELLED | OUTPATIENT
Start: 2020-08-22

## 2020-08-22 RX ORDER — MAGNESIUM HYDROXIDE 1200 MG/15ML
1000 LIQUID ORAL ONCE AS NEEDED
Status: DISCONTINUED | OUTPATIENT
Start: 2020-08-22 | End: 2020-08-22

## 2020-08-22 RX ADMIN — HYDROCODONE BITARTRATE AND ACETAMINOPHEN 1 TABLET: 5; 325 TABLET ORAL at 18:02

## 2020-08-22 RX ADMIN — FENTANYL CITRATE 100 MCG: 50 INJECTION, SOLUTION INTRAMUSCULAR; INTRAVENOUS at 06:26

## 2020-08-22 RX ADMIN — IBUPROFEN 800 MG: 800 TABLET, FILM COATED ORAL at 14:20

## 2020-08-22 RX ADMIN — LIDOCAINE HYDROCHLORIDE 3 ML: 10 INJECTION, SOLUTION INFILTRATION; PERINEURAL at 06:22

## 2020-08-22 RX ADMIN — LIDOCAINE HYDROCHLORIDE AND EPINEPHRINE 3 ML: 15; 5 INJECTION, SOLUTION EPIDURAL at 06:23

## 2020-08-22 RX ADMIN — HYDROCODONE BITARTRATE AND ACETAMINOPHEN 1 TABLET: 5; 325 TABLET ORAL at 22:05

## 2020-08-22 RX ADMIN — SODIUM CHLORIDE, POTASSIUM CHLORIDE, SODIUM LACTATE AND CALCIUM CHLORIDE 1000 ML: 600; 310; 30; 20 INJECTION, SOLUTION INTRAVENOUS at 03:15

## 2020-08-22 RX ADMIN — ROPIVACAINE HYDROCHLORIDE 14 ML/HR: 2 INJECTION, SOLUTION EPIDURAL; INFILTRATION at 06:26

## 2020-08-22 RX ADMIN — BUTORPHANOL TARTRATE 2 MG: 2 INJECTION, SOLUTION INTRAMUSCULAR; INTRAVENOUS at 03:16

## 2020-08-22 RX ADMIN — OXYTOCIN 2 MILLI-UNITS/MIN: 10 INJECTION, SOLUTION INTRAMUSCULAR; INTRAVENOUS at 07:51

## 2020-08-22 RX ADMIN — CETIRIZINE HYDROCHLORIDE 10 MG: 10 TABLET, FILM COATED ORAL at 14:00

## 2020-08-22 RX ADMIN — DOCUSATE SODIUM 100 MG: 100 CAPSULE ORAL at 17:22

## 2020-08-22 RX ADMIN — AMPICILLIN SODIUM 2 G: 2 INJECTION, POWDER, FOR SOLUTION INTRAVENOUS at 03:18

## 2020-08-22 RX ADMIN — IBUPROFEN 800 MG: 800 TABLET, FILM COATED ORAL at 20:06

## 2020-08-22 RX ADMIN — AMPICILLIN SODIUM 1 G: 1 INJECTION, POWDER, FOR SOLUTION INTRAMUSCULAR; INTRAVENOUS at 07:21

## 2020-08-22 RX ADMIN — LIDOCAINE HYDROCHLORIDE 5 ML: 20 INJECTION, SOLUTION EPIDURAL; INFILTRATION; INTRACAUDAL; PERINEURAL at 06:26

## 2020-08-22 RX ADMIN — Medication: at 17:22

## 2020-08-22 RX ADMIN — HYDROCODONE BITARTRATE AND ACETAMINOPHEN 1 TABLET: 5; 325 TABLET ORAL at 14:20

## 2020-08-22 RX ADMIN — WITCH HAZEL 1 PAD: 500 SOLUTION RECTAL; TOPICAL at 17:22

## 2020-08-22 NOTE — L&D DELIVERY NOTE
[facility]  Vaginal Delivery Note    Delivery     Delivery:       YOB: 2020    Time of Birth: 8:43 AM      Anesthesia:   KEYON    Delivering clinician:   Dr Thorpe   Forceps?   No   Vacuum? No    Shoulder dystocia present: No        Delivery narrative:              Pt delivered in the SUNI position.  Mouth and nares were bulb suctioned.  Anterior shoulder delivered atraumatically, followed by posterior shoulder.  Infant was placed to mother's chest.  Cord was doubly clamped and cut.  Cord blood was obtained.  Placenta was delivered spontaneously.  Uterus was firm with fundal massage.        Infant    Findings: female  infant     Infant observations: Weight: No birth weight on file.   Length:    in  Observations/Comments:         Apgars:    @ 1 minute /       @ 5 minutes   Infant Name:      Placenta, Cord, and Fluid    Placenta delivered     at        Cord:    present.   Nuchal Cord?  no   Cord blood obtained:                     Repair    Episiotomy: Not recorded    Lacerations: No   Estimated Blood Loss:   200 mls.   Suture used for repair:      Complications  none    Disposition  Mother to postpartum in stable condition.    Ana Thorpe DO  20  09:00

## 2020-08-22 NOTE — NURSING NOTE
New admit to room 229 to the services of Dr. Thorpe, G4,P3, LC4, @ 38 1/7 weeks gestation with complaints of abdominal cramping, patient denies any LOF, or VB and has +FM

## 2020-08-22 NOTE — PLAN OF CARE
Problem: Patient Care Overview  Goal: Plan of Care Review  Outcome: Ongoing (interventions implemented as appropriate)  Flowsheets (Taken 8/22/2020 1808)  Progress: improving  Plan of Care Reviewed With: patient  Outcome Summary: Vital signs wnl. Bleeding wnl. Fundus firm. Pain managed with pain meds given as ordered. Pt c/o feeling like she needs to have bowel movement, appropriate meds given see mar. Pt bonding appropriately with baby. Applied k-pad for back ache. Pt denies any needs additional needs at this time.     Problem: Postpartum (Vaginal Delivery) (Adult,Obstetrics,Pediatric)  Goal: Signs and Symptoms of Listed Potential Problems Will be Absent, Minimized or Managed (Postpartum)  Outcome: Ongoing (interventions implemented as appropriate)

## 2020-08-22 NOTE — H&P
LOGAN Jack  Obstetric History and Physical    Chief Complaint   Patient presents with   • Abdominal Cramping       Subjective     Patient is a 30 y.o. female  currently at 38w1d, who presents with bleeding and labor.     Her prenatal care is complicated by  substance abuse  tobacco use and h/o substance abuse, insufficient prenatal care  and desires sterlization  valid consents currently available.  Her previous obstetric/gynecological history is noted for is remarkable for h/o substance abuse. .    The following portions of the patients history were reviewed and updated as appropriate: current medications, allergies, past medical history, past surgical history, past family history, past social history and problem list .       Prenatal Information:   Maternal Prenatal Labs  Blood Type ABO Type   Date Value Ref Range Status   2020 AB  Final      Rh Status RH type   Date Value Ref Range Status   2020 Positive  Final      Antibody Screen Antibody Screen   Date Value Ref Range Status   2020 Negative  Final      Rapid Urine Drug Screen No results found for: AMPMETHU, BARBITSCNUR, LABBENZSCN, LABMETHSCN, LABOPIASCN, THCURSCR, COCAINEUR, AMPHETSCREEN, PROPOXSCN, BUPRENORSCNU, METAMPSCNUR, OXYCODONESCN, TRICYCLICSCN   Group B Strep Culture No results found for: GBSANTIGEN           External Prenatal Results     Pregnancy Outside Results - Transcribed From Office Records - See Scanned Records For Details     Test Value Date Time    Hgb 8.4 g/dL 20 0322      12.2 g/dL 20 0020    Hct 27.7 % 20 0322      38.2 % 20 0020    ABO AB  20 0322    Rh Positive  20 0322    Antibody Screen Negative  20 0322      Negative  07/10/20     Glucose Fasting GTT       Glucose Tolerance Test 1 hour       Glucose Tolerance Test 3 hour       Gonorrhea (discrete) Negative  07/10/20     Chlamydia (discrete) Negative  07/10/20     RPR Immune  07/10/20     VDRL       Syphilis Antibody        Rubella Immune  07/10/20     HBsAg Negative  07/10/20     Herpes Simplex Virus PCR       Herpes Simplex VIrus Culture       HIV Non-Reactive  07/10/20     Hep C RNA Quant PCR       Hep C Antibody greater than 11.0  07/10/20     AFP       Group B Strep       GBS Susceptibility to Clindamycin       GBS Susceptibility to Erythromycin       Fetal Fibronectin       Genetic Testing, Maternal Blood             Drug Screening     Test Value Date Time    Urine Drug Screen       Amphetamine Screen Negative  20 0143    Barbiturate Screen Negative  20 0143    Benzodiazepine Screen Negative  20 0143    Methadone Screen Negative  20 0143    Phencyclidine Screen Negative  20 0143    Opiates Screen Negative  20 0143    THC Screen Negative  20 0143    Cocaine Screen       Propoxyphene Screen Negative  16 0102    Buprenorphine Screen Negative  20 0143    Methamphetamine Screen       Oxycodone Screen Negative  20 0143    Tricyclic Antidepressants Screen                     Past OB History:     OB History    Para Term  AB Living   4 3 1 2 0 4   SAB TAB Ectopic Molar Multiple Live Births   0 0 0 0 1 4      # Outcome Date GA Lbr Carlos/2nd Weight Sex Delivery Anes PTL Lv   4 Current            3  14 32w5d  2948 g (6 lb 8 oz) M Vag-Spont EPI Y MACY   2A  07 31w0d  2750 g (6 lb 1 oz) M Vag-Spont EPI Y MACY   2B  07   2551 g (5 lb 10 oz) F Vag-Spont EPI Y MACY   1 Term 04 42w0d  3677 g (8 lb 1.7 oz) M Vag-Spont EPI N MACY       Past Medical History: Past Medical History:   Diagnosis Date   • Lupus (CMS/HCC)    • Substance abuse (CMS/HCC)       Past Surgical History Past Surgical History:   Procedure Laterality Date   • ADENOIDECTOMY     • APPENDECTOMY     • CHOLECYSTECTOMY     • TONSILLECTOMY     • TUBAL ABDOMINAL LIGATION        Family History: No family history on file.   Social History:  reports that she has been smoking.  She has been smoking about 3.00 packs per day. She does not have any smokeless tobacco history on file.   reports that she does not drink alcohol.   reports that she does not use drugs.        Review of Systems      Objective     Vital Signs Range for the last 24 hours  Temperature: Temp:  [97 °F (36.1 °C)] 97 °F (36.1 °C)   Temp Source: Temp src: Temporal   BP: BP: (134)/(89) 134/89   Pulse: Heart Rate:  [117] 117   Respirations: Resp:  [22] 22   Weight: Weight:  [88.5 kg (195 lb)] 88.5 kg (195 lb)     Physical Examination: General appearance - alert, well appearing, and in no distress  Abdomen - soft, nontender, nondistended, no masses or organomegaly  Extremities - peripheral pulses normal, no pedal edema, no clubbing or cyanosis    Presentation: cephalic   Cervix: Exam by:    Dilation:   4-5cm   Effacement: Cervical Effacement: 90%   Station:           Assessment/Plan       Pregnant      Assessment & Plan    Assessment/Plan:  1.  Intrauterine pregnancy at 38w1d weeks gestation with reactive fetal status.    2.  Active labor and bloody show- admitted for expectant management.  KEYON as desired.       Ana Thorpe DO  8/22/2020  08:54

## 2020-08-22 NOTE — NON STRESS TEST
Gisel Mena, a  at 38w1d with an KAYLEY of 2020, by Ultrasound, was seen at [facility] for a nonstress test.    Chief Complaint   Patient presents with   • Abdominal Cramping       There is no problem list on file for this patient.      Start Time: 40  Stop Time: 110    Interpretation A  Nonstress Test Interpretation A: Reactive (20 0110 : Serene Hernandez, RN)  Comments A: Verified by Emi Santo RN (20 : Serene Hernandez, RN)

## 2020-08-23 LAB
HCT VFR BLD AUTO: 25.2 % (ref 34–46.6)
HGB BLD-MCNC: 7.6 G/DL (ref 12–15.9)

## 2020-08-23 PROCEDURE — 85018 HEMOGLOBIN: CPT | Performed by: OBSTETRICS & GYNECOLOGY

## 2020-08-23 PROCEDURE — 85014 HEMATOCRIT: CPT | Performed by: OBSTETRICS & GYNECOLOGY

## 2020-08-23 RX ADMIN — MAGNESIUM HYDROXIDE 10 ML: 2400 SUSPENSION ORAL at 19:48

## 2020-08-23 RX ADMIN — DOCUSATE SODIUM 100 MG: 100 CAPSULE ORAL at 08:15

## 2020-08-23 RX ADMIN — HYDROCODONE BITARTRATE AND ACETAMINOPHEN 1 TABLET: 5; 325 TABLET ORAL at 19:44

## 2020-08-23 RX ADMIN — CETIRIZINE HYDROCHLORIDE 10 MG: 10 TABLET, FILM COATED ORAL at 08:15

## 2020-08-23 RX ADMIN — IBUPROFEN 800 MG: 800 TABLET, FILM COATED ORAL at 08:15

## 2020-08-23 RX ADMIN — DOCUSATE SODIUM 100 MG: 100 CAPSULE ORAL at 19:44

## 2020-08-23 RX ADMIN — IBUPROFEN 800 MG: 800 TABLET, FILM COATED ORAL at 20:47

## 2020-08-23 RX ADMIN — HYDROCODONE BITARTRATE AND ACETAMINOPHEN 1 TABLET: 5; 325 TABLET ORAL at 03:35

## 2020-08-23 RX ADMIN — IBUPROFEN 800 MG: 800 TABLET, FILM COATED ORAL at 16:53

## 2020-08-23 NOTE — PLAN OF CARE
Problem: Patient Care Overview  Goal: Plan of Care Review  Outcome: Ongoing (interventions implemented as appropriate)  Flowsheets (Taken 8/23/2020 1706)  Plan of Care Reviewed With: patient  Outcome Summary: Pain managed with scheduled pain meds given as ordered. Fundus firm, bleeding wnl. Vital signs wnl. Pt bonding with infant appropriately.     Problem: Postpartum (Vaginal Delivery) (Adult,Obstetrics,Pediatric)  Goal: Signs and Symptoms of Listed Potential Problems Will be Absent, Minimized or Managed (Postpartum)  Outcome: Ongoing (interventions implemented as appropriate)

## 2020-08-23 NOTE — PLAN OF CARE
Vital signs stable, pain seem to be improving at the end of this shift. No distress noted. Bleeding WNL, 2 quarter size clots, and 1 unable to say size of noted. Fundus firm without massage.

## 2020-08-24 VITALS
BODY MASS INDEX: 26.41 KG/M2 | HEIGHT: 72 IN | SYSTOLIC BLOOD PRESSURE: 144 MMHG | WEIGHT: 195 LBS | HEART RATE: 89 BPM | TEMPERATURE: 97.5 F | RESPIRATION RATE: 16 BRPM | DIASTOLIC BLOOD PRESSURE: 76 MMHG | OXYGEN SATURATION: 98 %

## 2020-08-24 RX ORDER — IBUPROFEN 800 MG/1
800 TABLET ORAL 3 TIMES DAILY
Qty: 60 TABLET | Refills: 1 | Status: ON HOLD | OUTPATIENT
Start: 2020-08-24 | End: 2021-11-28

## 2020-08-24 RX ADMIN — HYDROCODONE BITARTRATE AND ACETAMINOPHEN 1 TABLET: 5; 325 TABLET ORAL at 04:46

## 2020-08-24 RX ADMIN — DOCUSATE SODIUM 100 MG: 100 CAPSULE ORAL at 08:50

## 2020-08-24 RX ADMIN — IBUPROFEN 800 MG: 800 TABLET, FILM COATED ORAL at 08:50

## 2020-08-24 RX ADMIN — MAGNESIUM HYDROXIDE 10 ML: 2400 SUSPENSION ORAL at 08:50

## 2020-08-24 RX ADMIN — CETIRIZINE HYDROCHLORIDE 10 MG: 10 TABLET, FILM COATED ORAL at 11:51

## 2020-08-24 NOTE — PLAN OF CARE
Problem: Patient Care Overview  Goal: Plan of Care Review  Outcome: Ongoing (interventions implemented as appropriate)  Flowsheets (Taken 8/24/2020 0602)  Progress: improving  Plan of Care Reviewed With: patient  Outcome Summary: Pt voiding and ambulaitng without difficulty. Pain controlled with PRN and scheduled meds.

## 2021-11-28 ENCOUNTER — HOSPITAL ENCOUNTER (INPATIENT)
Facility: HOSPITAL | Age: 31
LOS: 4 days | Discharge: HOME | End: 2021-12-02
Attending: OBSTETRICS & GYNECOLOGY | Admitting: OBSTETRICS & GYNECOLOGY

## 2021-11-28 ENCOUNTER — ANESTHESIA (OUTPATIENT)
Dept: LABOR AND DELIVERY | Facility: HOSPITAL | Age: 31
End: 2021-11-28

## 2021-11-28 ENCOUNTER — ANESTHESIA EVENT (OUTPATIENT)
Dept: LABOR AND DELIVERY | Facility: HOSPITAL | Age: 31
End: 2021-11-28

## 2021-11-28 PROBLEM — O47.9 UTERINE CONTRACTIONS DURING PREGNANCY: Status: ACTIVE | Noted: 2021-11-28

## 2021-11-28 LAB
ABO GROUP BLD: NORMAL
ALBUMIN SERPL-MCNC: 2.99 G/DL (ref 3.5–5.2)
ALBUMIN/GLOB SERPL: 0.8 G/DL
ALP SERPL-CCNC: 148 U/L (ref 39–117)
ALT SERPL W P-5'-P-CCNC: 8 U/L (ref 1–33)
AMPHET+METHAMPHET UR QL: NEGATIVE
AMPHETAMINES UR QL: NEGATIVE
ANION GAP SERPL CALCULATED.3IONS-SCNC: 12 MMOL/L (ref 5–15)
AST SERPL-CCNC: 11 U/L (ref 1–32)
BARBITURATES UR QL SCN: NEGATIVE
BASOPHILS # BLD AUTO: 0.07 10*3/MM3 (ref 0–0.2)
BASOPHILS NFR BLD AUTO: 0.4 % (ref 0–1.5)
BENZODIAZ UR QL SCN: NEGATIVE
BILIRUB SERPL-MCNC: 0.2 MG/DL (ref 0–1.2)
BLD GP AB SCN SERPL QL: NEGATIVE
BUN SERPL-MCNC: 5 MG/DL (ref 6–20)
BUN/CREAT SERPL: 7 (ref 7–25)
BUPRENORPHINE SERPL-MCNC: NEGATIVE NG/ML
CALCIUM SPEC-SCNC: 7.7 MG/DL (ref 8.6–10.5)
CANNABINOIDS SERPL QL: NEGATIVE
CHLORIDE SERPL-SCNC: 102 MMOL/L (ref 98–107)
CO2 SERPL-SCNC: 21 MMOL/L (ref 22–29)
COCAINE UR QL: NEGATIVE
CREAT SERPL-MCNC: 0.71 MG/DL (ref 0.57–1)
DEPRECATED RDW RBC AUTO: 43.9 FL (ref 37–54)
EOSINOPHIL # BLD AUTO: 0.01 10*3/MM3 (ref 0–0.4)
EOSINOPHIL NFR BLD AUTO: 0.1 % (ref 0.3–6.2)
ERYTHROCYTE [DISTWIDTH] IN BLOOD BY AUTOMATED COUNT: 16.7 % (ref 12.3–15.4)
GFR SERPL CREATININE-BSD FRML MDRD: 96 ML/MIN/1.73
GLOBULIN UR ELPH-MCNC: 3.6 GM/DL
GLUCOSE SERPL-MCNC: 111 MG/DL (ref 65–99)
HBV SURFACE AG SERPL QL IA: NORMAL
HCT VFR BLD AUTO: 25.9 % (ref 34–46.6)
HCV AB SER DONR QL: REACTIVE
HGB BLD-MCNC: 7.5 G/DL (ref 12–15.9)
HIV1+2 AB SER QL: NORMAL
IMM GRANULOCYTES # BLD AUTO: 0.19 10*3/MM3 (ref 0–0.05)
IMM GRANULOCYTES NFR BLD AUTO: 1.2 % (ref 0–0.5)
LYMPHOCYTES # BLD AUTO: 1.91 10*3/MM3 (ref 0.7–3.1)
LYMPHOCYTES NFR BLD AUTO: 12.1 % (ref 19.6–45.3)
MCH RBC QN AUTO: 21.1 PG (ref 26.6–33)
MCHC RBC AUTO-ENTMCNC: 29 G/DL (ref 31.5–35.7)
MCV RBC AUTO: 72.8 FL (ref 79–97)
METHADONE UR QL SCN: NEGATIVE
MONOCYTES # BLD AUTO: 1.06 10*3/MM3 (ref 0.1–0.9)
MONOCYTES NFR BLD AUTO: 6.7 % (ref 5–12)
NEUTROPHILS NFR BLD AUTO: 12.55 10*3/MM3 (ref 1.7–7)
NEUTROPHILS NFR BLD AUTO: 79.5 % (ref 42.7–76)
NRBC BLD AUTO-RTO: 0 /100 WBC (ref 0–0.2)
OPIATES UR QL: NEGATIVE
OXYCODONE UR QL SCN: NEGATIVE
PCP UR QL SCN: NEGATIVE
PLATELET # BLD AUTO: 313 10*3/MM3 (ref 140–450)
PMV BLD AUTO: 10.1 FL (ref 6–12)
POTASSIUM SERPL-SCNC: 3.5 MMOL/L (ref 3.5–5.2)
PROPOXYPH UR QL: NEGATIVE
PROT SERPL-MCNC: 6.6 G/DL (ref 6–8.5)
RBC # BLD AUTO: 3.56 10*6/MM3 (ref 3.77–5.28)
RH BLD: POSITIVE
SARS-COV-2 RNA RESP QL NAA+PROBE: NOT DETECTED
SODIUM SERPL-SCNC: 135 MMOL/L (ref 136–145)
T&S EXPIRATION DATE: NORMAL
TRICYCLICS UR QL SCN: NEGATIVE
URATE SERPL-MCNC: 4.3 MG/DL (ref 2.4–5.7)
WBC NRBC COR # BLD: 15.79 10*3/MM3 (ref 3.4–10.8)

## 2021-11-28 PROCEDURE — 80081 OBSTETRIC PANEL INC HIV TSTG: CPT | Performed by: OBSTETRICS & GYNECOLOGY

## 2021-11-28 PROCEDURE — 87661 TRICHOMONAS VAGINALIS AMPLIF: CPT | Performed by: OBSTETRICS & GYNECOLOGY

## 2021-11-28 PROCEDURE — G0432 EIA HIV-1/HIV-2 SCREEN: HCPCS | Performed by: OBSTETRICS & GYNECOLOGY

## 2021-11-28 PROCEDURE — 84550 ASSAY OF BLOOD/URIC ACID: CPT | Performed by: OBSTETRICS & GYNECOLOGY

## 2021-11-28 PROCEDURE — 80306 DRUG TEST PRSMV INSTRMNT: CPT | Performed by: OBSTETRICS & GYNECOLOGY

## 2021-11-28 PROCEDURE — 25010000002 ROPIVACAINE PER 1 MG: Performed by: NURSE ANESTHETIST, CERTIFIED REGISTERED

## 2021-11-28 PROCEDURE — 86901 BLOOD TYPING SEROLOGIC RH(D): CPT | Performed by: OBSTETRICS & GYNECOLOGY

## 2021-11-28 PROCEDURE — C1755 CATHETER, INTRASPINAL: HCPCS | Performed by: NURSE ANESTHETIST, CERTIFIED REGISTERED

## 2021-11-28 PROCEDURE — 86850 RBC ANTIBODY SCREEN: CPT | Performed by: OBSTETRICS & GYNECOLOGY

## 2021-11-28 PROCEDURE — 87591 N.GONORRHOEAE DNA AMP PROB: CPT | Performed by: OBSTETRICS & GYNECOLOGY

## 2021-11-28 PROCEDURE — 0 LIDOCAINE 1 % SOLUTION: Performed by: NURSE ANESTHETIST, CERTIFIED REGISTERED

## 2021-11-28 PROCEDURE — 25010000002 BUTORPHANOL PER 1 MG: Performed by: OBSTETRICS & GYNECOLOGY

## 2021-11-28 PROCEDURE — 87340 HEPATITIS B SURFACE AG IA: CPT | Performed by: OBSTETRICS & GYNECOLOGY

## 2021-11-28 PROCEDURE — 86762 RUBELLA ANTIBODY: CPT | Performed by: OBSTETRICS & GYNECOLOGY

## 2021-11-28 PROCEDURE — 86900 BLOOD TYPING SEROLOGIC ABO: CPT | Performed by: OBSTETRICS & GYNECOLOGY

## 2021-11-28 PROCEDURE — 85025 COMPLETE CBC W/AUTO DIFF WBC: CPT | Performed by: OBSTETRICS & GYNECOLOGY

## 2021-11-28 PROCEDURE — 80053 COMPREHEN METABOLIC PANEL: CPT | Performed by: OBSTETRICS & GYNECOLOGY

## 2021-11-28 PROCEDURE — C1755 CATHETER, INTRASPINAL: HCPCS

## 2021-11-28 PROCEDURE — 86803 HEPATITIS C AB TEST: CPT | Performed by: OBSTETRICS & GYNECOLOGY

## 2021-11-28 PROCEDURE — 59025 FETAL NON-STRESS TEST: CPT

## 2021-11-28 PROCEDURE — 86923 COMPATIBILITY TEST ELECTRIC: CPT

## 2021-11-28 PROCEDURE — 25010000002 AMPICILLIN PER 500 MG: Performed by: OBSTETRICS & GYNECOLOGY

## 2021-11-28 PROCEDURE — 25010000002 FENTANYL CITRATE (PF) 50 MCG/ML SOLUTION: Performed by: NURSE ANESTHETIST, CERTIFIED REGISTERED

## 2021-11-28 PROCEDURE — 87491 CHLMYD TRACH DNA AMP PROBE: CPT | Performed by: OBSTETRICS & GYNECOLOGY

## 2021-11-28 PROCEDURE — U0003 INFECTIOUS AGENT DETECTION BY NUCLEIC ACID (DNA OR RNA); SEVERE ACUTE RESPIRATORY SYNDROME CORONAVIRUS 2 (SARS-COV-2) (CORONAVIRUS DISEASE [COVID-19]), AMPLIFIED PROBE TECHNIQUE, MAKING USE OF HIGH THROUGHPUT TECHNOLOGIES AS DESCRIBED BY CMS-2020-01-R: HCPCS | Performed by: OBSTETRICS & GYNECOLOGY

## 2021-11-28 RX ORDER — LIDOCAINE HYDROCHLORIDE 20 MG/ML
INJECTION, SOLUTION EPIDURAL; INFILTRATION; INTRACAUDAL; PERINEURAL
Status: COMPLETED
Start: 2021-11-28 | End: 2021-11-28

## 2021-11-28 RX ORDER — ONDANSETRON 4 MG/1
4 TABLET, FILM COATED ORAL EVERY 6 HOURS PRN
Status: DISCONTINUED | OUTPATIENT
Start: 2021-11-28 | End: 2021-11-29

## 2021-11-28 RX ORDER — SODIUM CHLORIDE 0.9 % (FLUSH) 0.9 %
3-10 SYRINGE (ML) INJECTION AS NEEDED
Status: DISCONTINUED | OUTPATIENT
Start: 2021-11-28 | End: 2021-11-29

## 2021-11-28 RX ORDER — ROPIVACAINE HYDROCHLORIDE 2 MG/ML
INJECTION, SOLUTION EPIDURAL; INFILTRATION; PERINEURAL AS NEEDED
Status: DISCONTINUED | OUTPATIENT
Start: 2021-11-28 | End: 2021-11-29 | Stop reason: SURG

## 2021-11-28 RX ORDER — ACETAMINOPHEN 325 MG/1
650 TABLET ORAL EVERY 4 HOURS PRN
Status: DISCONTINUED | OUTPATIENT
Start: 2021-11-28 | End: 2021-11-29

## 2021-11-28 RX ORDER — BUTORPHANOL TARTRATE 1 MG/ML
1 INJECTION, SOLUTION INTRAMUSCULAR; INTRAVENOUS
Status: DISCONTINUED | OUTPATIENT
Start: 2021-11-28 | End: 2021-11-29

## 2021-11-28 RX ORDER — ROPIVACAINE HYDROCHLORIDE 2 MG/ML
14 INJECTION, SOLUTION EPIDURAL; INFILTRATION; PERINEURAL CONTINUOUS
Status: DISCONTINUED | OUTPATIENT
Start: 2021-11-28 | End: 2021-11-29

## 2021-11-28 RX ORDER — LIDOCAINE HYDROCHLORIDE 20 MG/ML
INJECTION, SOLUTION EPIDURAL; INFILTRATION; INTRACAUDAL; PERINEURAL AS NEEDED
Status: DISCONTINUED | OUTPATIENT
Start: 2021-11-28 | End: 2021-11-29 | Stop reason: SURG

## 2021-11-28 RX ORDER — LIDOCAINE HYDROCHLORIDE AND EPINEPHRINE 10; 10 MG/ML; UG/ML
INJECTION, SOLUTION INFILTRATION; PERINEURAL AS NEEDED
Status: DISCONTINUED | OUTPATIENT
Start: 2021-11-28 | End: 2021-11-29 | Stop reason: SURG

## 2021-11-28 RX ORDER — ROPIVACAINE HYDROCHLORIDE 2 MG/ML
INJECTION, SOLUTION EPIDURAL; INFILTRATION; PERINEURAL
Status: COMPLETED
Start: 2021-11-28 | End: 2021-11-28

## 2021-11-28 RX ORDER — ONDANSETRON 2 MG/ML
4 INJECTION INTRAMUSCULAR; INTRAVENOUS EVERY 6 HOURS PRN
Status: DISCONTINUED | OUTPATIENT
Start: 2021-11-28 | End: 2021-11-29

## 2021-11-28 RX ORDER — FENTANYL CITRATE 50 UG/ML
INJECTION, SOLUTION INTRAMUSCULAR; INTRAVENOUS AS NEEDED
Status: DISCONTINUED | OUTPATIENT
Start: 2021-11-28 | End: 2021-11-29 | Stop reason: SURG

## 2021-11-28 RX ORDER — SODIUM CHLORIDE, SODIUM LACTATE, POTASSIUM CHLORIDE, CALCIUM CHLORIDE 600; 310; 30; 20 MG/100ML; MG/100ML; MG/100ML; MG/100ML
125 INJECTION, SOLUTION INTRAVENOUS CONTINUOUS
Status: DISCONTINUED | OUTPATIENT
Start: 2021-11-28 | End: 2021-11-29

## 2021-11-28 RX ORDER — ONDANSETRON 2 MG/ML
4 INJECTION INTRAMUSCULAR; INTRAVENOUS ONCE AS NEEDED
Status: DISCONTINUED | OUTPATIENT
Start: 2021-11-28 | End: 2021-11-29

## 2021-11-28 RX ORDER — FENTANYL CITRATE 50 UG/ML
INJECTION, SOLUTION INTRAMUSCULAR; INTRAVENOUS
Status: COMPLETED
Start: 2021-11-28 | End: 2021-11-28

## 2021-11-28 RX ORDER — EPHEDRINE SULFATE 50 MG/ML
10 INJECTION, SOLUTION INTRAVENOUS
Status: DISCONTINUED | OUTPATIENT
Start: 2021-11-28 | End: 2021-11-29

## 2021-11-28 RX ORDER — OXYTOCIN-SODIUM CHLORIDE 0.9% IV SOLN 30 UNIT/500ML 30-0.9/5 UT/ML-%
2-20 SOLUTION INTRAVENOUS
Status: DISCONTINUED | OUTPATIENT
Start: 2021-11-28 | End: 2021-11-29

## 2021-11-28 RX ORDER — LIDOCAINE HYDROCHLORIDE 10 MG/ML
INJECTION, SOLUTION INFILTRATION; PERINEURAL AS NEEDED
Status: DISCONTINUED | OUTPATIENT
Start: 2021-11-28 | End: 2021-11-29 | Stop reason: SURG

## 2021-11-28 RX ADMIN — LIDOCAINE HYDROCHLORIDE 3 ML: 10 INJECTION, SOLUTION INFILTRATION; PERINEURAL at 19:52

## 2021-11-28 RX ADMIN — LIDOCAINE HYDROCHLORIDE,EPINEPHRINE BITARTRATE 5 ML: 10; .01 INJECTION, SOLUTION INFILTRATION; PERINEURAL at 20:00

## 2021-11-28 RX ADMIN — SODIUM CHLORIDE, POTASSIUM CHLORIDE, SODIUM LACTATE AND CALCIUM CHLORIDE 999 ML/HR: 600; 310; 30; 20 INJECTION, SOLUTION INTRAVENOUS at 18:38

## 2021-11-28 RX ADMIN — SODIUM CHLORIDE, POTASSIUM CHLORIDE, SODIUM LACTATE AND CALCIUM CHLORIDE 125 ML/HR: 600; 310; 30; 20 INJECTION, SOLUTION INTRAVENOUS at 22:25

## 2021-11-28 RX ADMIN — SODIUM CHLORIDE, POTASSIUM CHLORIDE, SODIUM LACTATE AND CALCIUM CHLORIDE 1000 ML: 600; 310; 30; 20 INJECTION, SOLUTION INTRAVENOUS at 19:59

## 2021-11-28 RX ADMIN — FENTANYL CITRATE 100 MCG: 50 INJECTION, SOLUTION INTRAMUSCULAR; INTRAVENOUS at 20:03

## 2021-11-28 RX ADMIN — LIDOCAINE HYDROCHLORIDE 3 ML: 10 INJECTION, SOLUTION INFILTRATION; PERINEURAL at 19:57

## 2021-11-28 RX ADMIN — AMPICILLIN SODIUM 2 G: 2 INJECTION, POWDER, FOR SOLUTION INTRAVENOUS at 18:42

## 2021-11-28 RX ADMIN — EPHEDRINE SULFATE 10 MG: 50 INJECTION INTRAVENOUS at 21:28

## 2021-11-28 RX ADMIN — EPHEDRINE SULFATE 10 MG: 50 INJECTION INTRAVENOUS at 21:29

## 2021-11-28 RX ADMIN — ROPIVACAINE HYDROCHLORIDE 14 ML: 2 INJECTION, SOLUTION EPIDURAL; INFILTRATION at 20:03

## 2021-11-28 RX ADMIN — EPHEDRINE SULFATE 10 MG: 50 INJECTION INTRAVENOUS at 21:34

## 2021-11-28 RX ADMIN — OXYTOCIN 2 MILLI-UNITS/MIN: 10 INJECTION, SOLUTION INTRAMUSCULAR; INTRAVENOUS at 23:00

## 2021-11-28 RX ADMIN — LIDOCAINE HYDROCHLORIDE 5 ML: 20 INJECTION, SOLUTION EPIDURAL; INFILTRATION; INTRACAUDAL; PERINEURAL at 20:03

## 2021-11-28 RX ADMIN — AMPICILLIN SODIUM 1 G: 1 INJECTION, POWDER, FOR SOLUTION INTRAMUSCULAR; INTRAVENOUS at 23:05

## 2021-11-28 RX ADMIN — BUTORPHANOL TARTRATE 2 MG: 2 INJECTION, SOLUTION INTRAMUSCULAR; INTRAVENOUS at 19:15

## 2021-11-28 RX ADMIN — SODIUM CHLORIDE, POTASSIUM CHLORIDE, SODIUM LACTATE AND CALCIUM CHLORIDE 125 ML/HR: 600; 310; 30; 20 INJECTION, SOLUTION INTRAVENOUS at 21:09

## 2021-11-29 VITALS
HEART RATE: 102 BPM | DIASTOLIC BLOOD PRESSURE: 74 MMHG | TEMPERATURE: 97.3 F | SYSTOLIC BLOOD PRESSURE: 130 MMHG | OXYGEN SATURATION: 100 %

## 2021-11-29 PROBLEM — O47.9 UTERINE CONTRACTIONS DURING PREGNANCY: Status: RESOLVED | Noted: 2021-11-28 | Resolved: 2021-11-29

## 2021-11-29 PROBLEM — O09.30 INSUFFICIENT PRENATAL CARE: Status: ACTIVE | Noted: 2021-11-29

## 2021-11-29 LAB
BACTERIA UR QL AUTO: ABNORMAL /HPF
BILIRUB UR QL STRIP: NEGATIVE
C TRACH RRNA CVX QL NAA+PROBE: NOT DETECTED
CLARITY UR: ABNORMAL
COLOR UR: YELLOW
GLUCOSE UR STRIP-MCNC: NEGATIVE MG/DL
HGB UR QL STRIP.AUTO: ABNORMAL
HYALINE CASTS UR QL AUTO: ABNORMAL /LPF
KETONES UR QL STRIP: NEGATIVE
LEUKOCYTE ESTERASE UR QL STRIP.AUTO: ABNORMAL
N GONORRHOEA RRNA SPEC QL NAA+PROBE: NOT DETECTED
NITRITE UR QL STRIP: NEGATIVE
PH UR STRIP.AUTO: 6.5 [PH] (ref 5–8)
PROT UR QL STRIP: ABNORMAL
RBC # UR STRIP: ABNORMAL /HPF
REF LAB TEST METHOD: ABNORMAL
SP GR UR STRIP: 1.02 (ref 1–1.03)
SQUAMOUS #/AREA URNS HPF: ABNORMAL /HPF
TRICHOMONAS VAGINALIS PCR: NOT DETECTED
UROBILINOGEN UR QL STRIP: ABNORMAL
WBC # UR STRIP: ABNORMAL /HPF

## 2021-11-29 PROCEDURE — 25010000002 GENTAMICIN PER 80 MG: Performed by: OBSTETRICS & GYNECOLOGY

## 2021-11-29 PROCEDURE — 87086 URINE CULTURE/COLONY COUNT: CPT | Performed by: OBSTETRICS & GYNECOLOGY

## 2021-11-29 PROCEDURE — 88307 TISSUE EXAM BY PATHOLOGIST: CPT | Performed by: OBSTETRICS & GYNECOLOGY

## 2021-11-29 PROCEDURE — 86038 ANTINUCLEAR ANTIBODIES: CPT | Performed by: OBSTETRICS & GYNECOLOGY

## 2021-11-29 PROCEDURE — 4A1HX4Z MONITORING OF PRODUCTS OF CONCEPTION, CARDIAC ELECTRICAL ACTIVITY, EXTERNAL APPROACH: ICD-10-PCS | Performed by: OBSTETRICS & GYNECOLOGY

## 2021-11-29 PROCEDURE — 87088 URINE BACTERIA CULTURE: CPT | Performed by: OBSTETRICS & GYNECOLOGY

## 2021-11-29 PROCEDURE — 59025 FETAL NON-STRESS TEST: CPT

## 2021-11-29 PROCEDURE — 87186 SC STD MICRODIL/AGAR DIL: CPT | Performed by: OBSTETRICS & GYNECOLOGY

## 2021-11-29 PROCEDURE — 25010000002 AMPICILLIN PER 500 MG: Performed by: OBSTETRICS & GYNECOLOGY

## 2021-11-29 PROCEDURE — 81001 URINALYSIS AUTO W/SCOPE: CPT | Performed by: OBSTETRICS & GYNECOLOGY

## 2021-11-29 PROCEDURE — 25010000002 IRON SUCROSE PER 1 MG: Performed by: OBSTETRICS & GYNECOLOGY

## 2021-11-29 RX ORDER — BISACODYL 10 MG
10 SUPPOSITORY, RECTAL RECTAL DAILY PRN
Status: DISCONTINUED | OUTPATIENT
Start: 2021-11-30 | End: 2021-12-02 | Stop reason: HOSPADM

## 2021-11-29 RX ORDER — METOCLOPRAMIDE 10 MG/1
10 TABLET ORAL ONCE
Status: DISCONTINUED | OUTPATIENT
Start: 2021-11-29 | End: 2021-12-02 | Stop reason: HOSPADM

## 2021-11-29 RX ORDER — OXYTOCIN-SODIUM CHLORIDE 0.9% IV SOLN 30 UNIT/500ML 30-0.9/5 UT/ML-%
250 SOLUTION INTRAVENOUS CONTINUOUS
Status: ACTIVE | OUTPATIENT
Start: 2021-11-29 | End: 2021-11-29

## 2021-11-29 RX ORDER — ACETAMINOPHEN 500 MG
1000 TABLET ORAL EVERY 6 HOURS PRN
Status: DISCONTINUED | OUTPATIENT
Start: 2021-11-29 | End: 2021-12-02 | Stop reason: HOSPADM

## 2021-11-29 RX ORDER — OXYTOCIN-SODIUM CHLORIDE 0.9% IV SOLN 30 UNIT/500ML 30-0.9/5 UT/ML-%
999 SOLUTION INTRAVENOUS ONCE
Status: COMPLETED | OUTPATIENT
Start: 2021-11-29 | End: 2021-11-29

## 2021-11-29 RX ORDER — IBUPROFEN 800 MG/1
800 TABLET ORAL 3 TIMES DAILY
Status: DISCONTINUED | OUTPATIENT
Start: 2021-11-29 | End: 2021-11-29

## 2021-11-29 RX ORDER — ONDANSETRON 4 MG/1
4 TABLET, FILM COATED ORAL EVERY 8 HOURS PRN
Status: DISCONTINUED | OUTPATIENT
Start: 2021-11-29 | End: 2021-12-02 | Stop reason: HOSPADM

## 2021-11-29 RX ORDER — TRANEXAMIC ACID 100 MG/ML
INJECTION, SOLUTION INTRAVENOUS
Status: DISCONTINUED
Start: 2021-11-29 | End: 2021-12-02 | Stop reason: HOSPADM

## 2021-11-29 RX ORDER — ACETAMINOPHEN 500 MG
1000 TABLET ORAL EVERY 4 HOURS PRN
Status: DISCONTINUED | OUTPATIENT
Start: 2021-11-29 | End: 2021-11-29

## 2021-11-29 RX ORDER — MISOPROSTOL 100 UG/1
600 TABLET ORAL AS NEEDED
Status: DISCONTINUED | OUTPATIENT
Start: 2021-11-29 | End: 2021-11-29

## 2021-11-29 RX ORDER — DOCUSATE SODIUM 100 MG/1
100 CAPSULE, LIQUID FILLED ORAL 2 TIMES DAILY
Status: DISCONTINUED | OUTPATIENT
Start: 2021-11-29 | End: 2021-12-02 | Stop reason: HOSPADM

## 2021-11-29 RX ORDER — MISOPROSTOL 100 UG/1
800 TABLET ORAL AS NEEDED
Status: DISCONTINUED | OUTPATIENT
Start: 2021-11-29 | End: 2021-11-29 | Stop reason: HOSPADM

## 2021-11-29 RX ORDER — KETOROLAC TROMETHAMINE 10 MG/1
10 TABLET, FILM COATED ORAL EVERY 6 HOURS
Status: COMPLETED | OUTPATIENT
Start: 2021-11-29 | End: 2021-11-30

## 2021-11-29 RX ORDER — OXYTOCIN-SODIUM CHLORIDE 0.9% IV SOLN 30 UNIT/500ML 30-0.9/5 UT/ML-%
125 SOLUTION INTRAVENOUS CONTINUOUS PRN
Status: DISCONTINUED | OUTPATIENT
Start: 2021-11-29 | End: 2021-11-29 | Stop reason: HOSPADM

## 2021-11-29 RX ORDER — SODIUM CHLORIDE 0.9 % (FLUSH) 0.9 %
1-10 SYRINGE (ML) INJECTION AS NEEDED
Status: DISCONTINUED | OUTPATIENT
Start: 2021-11-29 | End: 2021-12-02 | Stop reason: HOSPADM

## 2021-11-29 RX ORDER — NYSTATIN 100000 U/G
1 OINTMENT TOPICAL EVERY 12 HOURS SCHEDULED
Status: DISCONTINUED | OUTPATIENT
Start: 2021-11-29 | End: 2021-12-02 | Stop reason: HOSPADM

## 2021-11-29 RX ORDER — DIPHENHYDRAMINE HCL 25 MG
25 CAPSULE ORAL NIGHTLY PRN
Status: DISCONTINUED | OUTPATIENT
Start: 2021-11-29 | End: 2021-12-02 | Stop reason: HOSPADM

## 2021-11-29 RX ORDER — IBUPROFEN 800 MG/1
800 TABLET ORAL EVERY 6 HOURS PRN
Status: DISCONTINUED | OUTPATIENT
Start: 2021-11-29 | End: 2021-11-29 | Stop reason: HOSPADM

## 2021-11-29 RX ORDER — METHYLERGONOVINE MALEATE 0.2 MG/ML
200 INJECTION INTRAVENOUS ONCE AS NEEDED
Status: DISCONTINUED | OUTPATIENT
Start: 2021-11-29 | End: 2021-11-29 | Stop reason: HOSPADM

## 2021-11-29 RX ORDER — HYDROCORTISONE 25 MG/G
1 CREAM TOPICAL AS NEEDED
Status: DISCONTINUED | OUTPATIENT
Start: 2021-11-29 | End: 2021-12-02 | Stop reason: HOSPADM

## 2021-11-29 RX ORDER — MISOPROSTOL 100 UG/1
TABLET ORAL
Status: COMPLETED
Start: 2021-11-29 | End: 2021-11-29

## 2021-11-29 RX ORDER — HYDROCORTISONE ACETATE PRAMOXINE HCL 1; 1 G/100G; G/100G
1 CREAM TOPICAL AS NEEDED
Status: DISCONTINUED | OUTPATIENT
Start: 2021-11-29 | End: 2021-12-02 | Stop reason: HOSPADM

## 2021-11-29 RX ORDER — CARBOPROST TROMETHAMINE 250 UG/ML
250 INJECTION, SOLUTION INTRAMUSCULAR AS NEEDED
Status: DISCONTINUED | OUTPATIENT
Start: 2021-11-29 | End: 2021-11-29 | Stop reason: HOSPADM

## 2021-11-29 RX ORDER — ACETAMINOPHEN 325 MG/1
650 TABLET ORAL EVERY 4 HOURS PRN
Status: DISCONTINUED | OUTPATIENT
Start: 2021-11-29 | End: 2021-11-29 | Stop reason: HOSPADM

## 2021-11-29 RX ADMIN — AMPICILLIN SODIUM 2 G: 2 INJECTION, POWDER, FOR SOLUTION INTRAVENOUS at 17:06

## 2021-11-29 RX ADMIN — KETOROLAC TROMETHAMINE 10 MG: 10 TABLET, FILM COATED ORAL at 09:12

## 2021-11-29 RX ADMIN — OXYTOCIN-SODIUM CHLORIDE 0.9% IV SOLN 30 UNIT/500ML 999 ML/HR: 30-0.9/5 SOLUTION at 00:45

## 2021-11-29 RX ADMIN — GENTAMICIN SULFATE 430 MG: 40 INJECTION, SOLUTION INTRAMUSCULAR; INTRAVENOUS at 04:32

## 2021-11-29 RX ADMIN — AMPICILLIN SODIUM 2 G: 2 INJECTION, POWDER, FOR SOLUTION INTRAVENOUS at 05:18

## 2021-11-29 RX ADMIN — TRANEXAMIC ACID 1000 MG: 100 INJECTION, SOLUTION INTRAVENOUS at 01:22

## 2021-11-29 RX ADMIN — NYSTATIN 1 APPLICATION: 100000 OINTMENT TOPICAL at 11:43

## 2021-11-29 RX ADMIN — ACETAMINOPHEN 1000 MG: 500 TABLET ORAL at 03:47

## 2021-11-29 RX ADMIN — NYSTATIN 1 APPLICATION: 100000 OINTMENT TOPICAL at 21:39

## 2021-11-29 RX ADMIN — MISOPROSTOL 800 MCG: 100 TABLET ORAL at 01:15

## 2021-11-29 RX ADMIN — AMPICILLIN SODIUM 2 G: 2 INJECTION, POWDER, FOR SOLUTION INTRAVENOUS at 11:42

## 2021-11-29 RX ADMIN — DOCUSATE SODIUM 100 MG: 100 CAPSULE ORAL at 09:02

## 2021-11-29 RX ADMIN — IBUPROFEN 800 MG: 800 TABLET, FILM COATED ORAL at 03:12

## 2021-11-29 RX ADMIN — IRON SUCROSE 300 MG: 20 INJECTION, SOLUTION INTRAVENOUS at 06:00

## 2021-11-29 RX ADMIN — KETOROLAC TROMETHAMINE 10 MG: 10 TABLET, FILM COATED ORAL at 21:39

## 2021-11-29 RX ADMIN — KETOROLAC TROMETHAMINE 10 MG: 10 TABLET, FILM COATED ORAL at 15:18

## 2021-11-29 RX ADMIN — IBUPROFEN 800 MG: 800 TABLET, FILM COATED ORAL at 03:09

## 2021-11-30 LAB
ANA SER QL: NEGATIVE
HCT VFR BLD AUTO: 18.3 % (ref 34–46.6)
HCT VFR BLD AUTO: 25.2 % (ref 34–46.6)
HGB BLD-MCNC: 5 G/DL (ref 12–15.9)
HGB BLD-MCNC: 7.5 G/DL (ref 12–15.9)
RPR SER QL: NORMAL
RUBV IGG SERPL IA-ACNC: 2.24 INDEX

## 2021-11-30 PROCEDURE — 85018 HEMOGLOBIN: CPT | Performed by: OBSTETRICS & GYNECOLOGY

## 2021-11-30 PROCEDURE — 36430 TRANSFUSION BLD/BLD COMPNT: CPT

## 2021-11-30 PROCEDURE — 85014 HEMATOCRIT: CPT | Performed by: OBSTETRICS & GYNECOLOGY

## 2021-11-30 PROCEDURE — 25010000002 AMPICILLIN PER 500 MG: Performed by: OBSTETRICS & GYNECOLOGY

## 2021-11-30 PROCEDURE — P9016 RBC LEUKOCYTES REDUCED: HCPCS

## 2021-11-30 PROCEDURE — 86900 BLOOD TYPING SEROLOGIC ABO: CPT

## 2021-11-30 PROCEDURE — 63710000001 DIPHENHYDRAMINE PER 50 MG: Performed by: OBSTETRICS & GYNECOLOGY

## 2021-11-30 PROCEDURE — 25010000002 AMPICILLIN PER 500 MG: Performed by: NURSE PRACTITIONER

## 2021-11-30 RX ORDER — DIPHENHYDRAMINE HCL 25 MG
25 CAPSULE ORAL ONCE
Status: COMPLETED | OUTPATIENT
Start: 2021-11-30 | End: 2021-11-30

## 2021-11-30 RX ORDER — FLUTICASONE PROPIONATE 50 MCG
2 SPRAY, SUSPENSION (ML) NASAL DAILY
Status: DISCONTINUED | OUTPATIENT
Start: 2021-12-01 | End: 2021-11-30

## 2021-11-30 RX ORDER — FLUTICASONE PROPIONATE 50 MCG
2 SPRAY, SUSPENSION (ML) NASAL 2 TIMES DAILY PRN
Status: DISCONTINUED | OUTPATIENT
Start: 2021-11-30 | End: 2021-12-02 | Stop reason: HOSPADM

## 2021-11-30 RX ORDER — LABETALOL 100 MG/1
100 TABLET, FILM COATED ORAL ONCE
Status: COMPLETED | OUTPATIENT
Start: 2021-11-30 | End: 2021-11-30

## 2021-11-30 RX ORDER — DIPHENHYDRAMINE HYDROCHLORIDE 50 MG/ML
25 INJECTION INTRAMUSCULAR; INTRAVENOUS ONCE
Status: COMPLETED | OUTPATIENT
Start: 2021-11-30 | End: 2021-11-30

## 2021-11-30 RX ORDER — LABETALOL 200 MG/1
200 TABLET, FILM COATED ORAL EVERY 12 HOURS SCHEDULED
Status: DISCONTINUED | OUTPATIENT
Start: 2021-11-30 | End: 2021-11-30

## 2021-11-30 RX ORDER — IBUPROFEN 800 MG/1
800 TABLET ORAL EVERY 4 HOURS PRN
Status: DISCONTINUED | OUTPATIENT
Start: 2021-11-30 | End: 2021-12-02 | Stop reason: HOSPADM

## 2021-11-30 RX ADMIN — ACETAMINOPHEN 1000 MG: 500 TABLET ORAL at 07:56

## 2021-11-30 RX ADMIN — AMPICILLIN SODIUM 2 G: 2 INJECTION, POWDER, FOR SOLUTION INTRAVENOUS at 22:02

## 2021-11-30 RX ADMIN — KETOROLAC TROMETHAMINE 10 MG: 10 TABLET, FILM COATED ORAL at 03:56

## 2021-11-30 RX ADMIN — NYSTATIN 1 APPLICATION: 100000 OINTMENT TOPICAL at 20:27

## 2021-11-30 RX ADMIN — IBUPROFEN 800 MG: 800 TABLET, FILM COATED ORAL at 13:04

## 2021-11-30 RX ADMIN — FLUTICASONE PROPIONATE 2 SPRAY: 50 SPRAY, METERED NASAL at 22:49

## 2021-11-30 RX ADMIN — ACETAMINOPHEN 1000 MG: 500 TABLET ORAL at 14:33

## 2021-11-30 RX ADMIN — IBUPROFEN 800 MG: 800 TABLET, FILM COATED ORAL at 20:27

## 2021-11-30 RX ADMIN — AMPICILLIN SODIUM 2 G: 2 INJECTION, POWDER, FOR SOLUTION INTRAVENOUS at 00:45

## 2021-11-30 RX ADMIN — DOCUSATE SODIUM 100 MG: 100 CAPSULE ORAL at 20:26

## 2021-11-30 RX ADMIN — LABETALOL HCL 100 MG: 100 TABLET, FILM COATED ORAL at 15:24

## 2021-11-30 RX ADMIN — AMPICILLIN SODIUM 2 G: 2 INJECTION, POWDER, FOR SOLUTION INTRAVENOUS at 17:42

## 2021-11-30 RX ADMIN — LABETALOL HYDROCHLORIDE 300 MG: 200 TABLET, FILM COATED ORAL at 20:26

## 2021-11-30 RX ADMIN — DIPHENHYDRAMINE HCL 25 MG: 25 CAPSULE ORAL at 09:25

## 2021-11-30 RX ADMIN — NYSTATIN 1 APPLICATION: 100000 OINTMENT TOPICAL at 08:00

## 2021-11-30 RX ADMIN — DOCUSATE SODIUM 100 MG: 100 CAPSULE ORAL at 08:00

## 2021-11-30 RX ADMIN — LABETALOL HYDROCHLORIDE 200 MG: 200 TABLET, FILM COATED ORAL at 09:25

## 2021-11-30 RX ADMIN — MAGNESIUM HYDROXIDE 10 ML: 2400 SUSPENSION ORAL at 22:49

## 2021-12-01 LAB
ANION GAP SERPL CALCULATED.3IONS-SCNC: 10.6 MMOL/L (ref 5–15)
BACTERIA SPEC AEROBE CULT: ABNORMAL
BH BB BLOOD EXPIRATION DATE: NORMAL
BH BB BLOOD EXPIRATION DATE: NORMAL
BH BB BLOOD TYPE BARCODE: 8400
BH BB BLOOD TYPE BARCODE: 8400
BH BB DISPENSE STATUS: NORMAL
BH BB DISPENSE STATUS: NORMAL
BH BB PRODUCT CODE: NORMAL
BH BB PRODUCT CODE: NORMAL
BH BB UNIT NUMBER: NORMAL
BH BB UNIT NUMBER: NORMAL
BUN SERPL-MCNC: 8 MG/DL (ref 6–20)
BUN/CREAT SERPL: 11.3 (ref 7–25)
CALCIUM SPEC-SCNC: 7.8 MG/DL (ref 8.6–10.5)
CHLORIDE SERPL-SCNC: 102 MMOL/L (ref 98–107)
CO2 SERPL-SCNC: 23.4 MMOL/L (ref 22–29)
CREAT SERPL-MCNC: 0.71 MG/DL (ref 0.57–1)
CROSSMATCH INTERPRETATION: NORMAL
CROSSMATCH INTERPRETATION: NORMAL
DEPRECATED RDW RBC AUTO: 48.5 FL (ref 37–54)
ERYTHROCYTE [DISTWIDTH] IN BLOOD BY AUTOMATED COUNT: 18.2 % (ref 12.3–15.4)
GENTAMICIN TROUGH SERPL-MCNC: 0.3 MCG/ML (ref 0.5–2)
GFR SERPL CREATININE-BSD FRML MDRD: 96 ML/MIN/1.73
GLUCOSE SERPL-MCNC: 85 MG/DL (ref 65–99)
HCT VFR BLD AUTO: 25.5 % (ref 34–46.6)
HGB BLD-MCNC: 7.6 G/DL (ref 12–15.9)
MCH RBC QN AUTO: 22.4 PG (ref 26.6–33)
MCHC RBC AUTO-ENTMCNC: 29.8 G/DL (ref 31.5–35.7)
MCV RBC AUTO: 75 FL (ref 79–97)
PLATELET # BLD AUTO: 338 10*3/MM3 (ref 140–450)
PMV BLD AUTO: 10.3 FL (ref 6–12)
POTASSIUM SERPL-SCNC: 4.5 MMOL/L (ref 3.5–5.2)
RBC # BLD AUTO: 3.4 10*6/MM3 (ref 3.77–5.28)
SODIUM SERPL-SCNC: 136 MMOL/L (ref 136–145)
UNIT  ABO: NORMAL
UNIT  ABO: NORMAL
UNIT  RH: NORMAL
UNIT  RH: NORMAL
WBC NRBC COR # BLD: 15.57 10*3/MM3 (ref 3.4–10.8)

## 2021-12-01 PROCEDURE — 85027 COMPLETE CBC AUTOMATED: CPT | Performed by: OBSTETRICS & GYNECOLOGY

## 2021-12-01 PROCEDURE — 25010000002 AMPICILLIN PER 500 MG: Performed by: NURSE PRACTITIONER

## 2021-12-01 PROCEDURE — 80170 ASSAY OF GENTAMICIN: CPT

## 2021-12-01 PROCEDURE — 80048 BASIC METABOLIC PNL TOTAL CA: CPT

## 2021-12-01 PROCEDURE — 25010000002 GENTAMICIN PER 80 MG: Performed by: OBSTETRICS & GYNECOLOGY

## 2021-12-01 RX ORDER — FERROUS SULFATE 325(65) MG
325 TABLET ORAL 2 TIMES DAILY WITH MEALS
Status: DISCONTINUED | OUTPATIENT
Start: 2021-12-01 | End: 2021-12-02 | Stop reason: HOSPADM

## 2021-12-01 RX ORDER — GUAIFENESIN 600 MG/1
600 TABLET, EXTENDED RELEASE ORAL EVERY 12 HOURS SCHEDULED
Status: DISCONTINUED | OUTPATIENT
Start: 2021-12-01 | End: 2021-12-02 | Stop reason: HOSPADM

## 2021-12-01 RX ORDER — NITROFURANTOIN 25; 75 MG/1; MG/1
100 CAPSULE ORAL EVERY 12 HOURS SCHEDULED
Status: DISCONTINUED | OUTPATIENT
Start: 2021-12-01 | End: 2021-12-02 | Stop reason: HOSPADM

## 2021-12-01 RX ADMIN — NITROFURANTOIN MONOHYDRATE/MACROCRYSTALLINE 100 MG: 25; 75 CAPSULE ORAL at 10:27

## 2021-12-01 RX ADMIN — GUAIFENESIN 600 MG: 600 TABLET, EXTENDED RELEASE ORAL at 21:43

## 2021-12-01 RX ADMIN — IBUPROFEN 800 MG: 800 TABLET, FILM COATED ORAL at 14:18

## 2021-12-01 RX ADMIN — GENTAMICIN SULFATE 430 MG: 40 INJECTION, SOLUTION INTRAMUSCULAR; INTRAVENOUS at 05:17

## 2021-12-01 RX ADMIN — NYSTATIN 1 APPLICATION: 100000 OINTMENT TOPICAL at 08:28

## 2021-12-01 RX ADMIN — AMPICILLIN SODIUM 2 G: 2 INJECTION, POWDER, FOR SOLUTION INTRAVENOUS at 04:00

## 2021-12-01 RX ADMIN — DOCUSATE SODIUM 100 MG: 100 CAPSULE ORAL at 21:44

## 2021-12-01 RX ADMIN — LABETALOL HYDROCHLORIDE 300 MG: 200 TABLET, FILM COATED ORAL at 08:28

## 2021-12-01 RX ADMIN — DOCUSATE SODIUM 100 MG: 100 CAPSULE ORAL at 08:28

## 2021-12-01 RX ADMIN — NITROFURANTOIN MONOHYDRATE/MACROCRYSTALLINE 100 MG: 25; 75 CAPSULE ORAL at 21:43

## 2021-12-01 RX ADMIN — AMPICILLIN SODIUM 2 G: 2 INJECTION, POWDER, FOR SOLUTION INTRAVENOUS at 10:27

## 2021-12-01 RX ADMIN — IBUPROFEN 800 MG: 800 TABLET, FILM COATED ORAL at 05:17

## 2021-12-01 RX ADMIN — FERROUS SULFATE TAB 325 MG (65 MG ELEMENTAL FE) 325 MG: 325 (65 FE) TAB at 19:00

## 2021-12-01 RX ADMIN — FERROUS SULFATE TAB 325 MG (65 MG ELEMENTAL FE) 325 MG: 325 (65 FE) TAB at 10:27

## 2021-12-01 NOTE — PLAN OF CARE
Problem: Adult Inpatient Plan of Care  Goal: Plan of Care Review  Outcome: Ongoing, Progressing  Flowsheets  Taken 12/1/2021 1736 by Becky Lentz RN  Outcome Summary: patient BP has improved, continue to monitor, voiding well, small lochia.  Taken 12/1/2021 0647 by Mat Lentz RN  Progress: improving  Plan of Care Reviewed With: patient  Goal: Patient-Specific Goal (Individualized)  Outcome: Ongoing, Progressing  Goal: Absence of Hospital-Acquired Illness or Injury  Outcome: Ongoing, Progressing  Intervention: Identify and Manage Fall Risk  Recent Flowsheet Documentation  Taken 12/1/2021 1700 by Becky Lentz RN  Safety Promotion/Fall Prevention: safety round/check completed  Taken 12/1/2021 1600 by Becky Lentz RN  Safety Promotion/Fall Prevention: safety round/check completed  Taken 12/1/2021 1500 by Becky Lentz RN  Safety Promotion/Fall Prevention: safety round/check completed  Taken 12/1/2021 1418 by Becky Lentz RN  Safety Promotion/Fall Prevention: safety round/check completed  Taken 12/1/2021 1300 by Becky Lentz RN  Safety Promotion/Fall Prevention: safety round/check completed  Taken 12/1/2021 1200 by Becky Lentz RN  Safety Promotion/Fall Prevention: safety round/check completed  Taken 12/1/2021 1100 by Bceky Lentz RN  Safety Promotion/Fall Prevention: safety round/check completed  Taken 12/1/2021 1000 by Becky Lentz RN  Safety Promotion/Fall Prevention: safety round/check completed  Taken 12/1/2021 0900 by Becky Lentz RN  Safety Promotion/Fall Prevention: safety round/check completed  Taken 12/1/2021 0800 by Becky Lentz RN  Safety Promotion/Fall Prevention: safety round/check completed  Intervention: Prevent Skin Injury  Recent Flowsheet Documentation  Taken 12/1/2021 0800 by Becky Lentz RN  Body Position: position changed independently  Intervention: Prevent  Infection  Recent Flowsheet Documentation  Taken 12/1/2021 0800 by Becky Lentz RN  Infection Prevention: rest/sleep promoted  Goal: Optimal Comfort and Wellbeing  Outcome: Ongoing, Progressing  Goal: Readiness for Transition of Care  Outcome: Ongoing, Progressing     Problem: Adult Inpatient Plan of Care  Goal: Plan of Care Review  Outcome: Ongoing, Progressing  Flowsheets  Taken 12/1/2021 1736 by Becky Lentz RN  Outcome Summary: patient BP has improved, continue to monitor, voiding well, small lochia.  Taken 12/1/2021 0647 by Mat Lentz RN  Progress: improving  Plan of Care Reviewed With: patient  Goal: Patient-Specific Goal (Individualized)  Outcome: Ongoing, Progressing  Goal: Absence of Hospital-Acquired Illness or Injury  Outcome: Ongoing, Progressing  Intervention: Identify and Manage Fall Risk  Recent Flowsheet Documentation  Taken 12/1/2021 1700 by Becky Lentz RN  Safety Promotion/Fall Prevention: safety round/check completed  Taken 12/1/2021 1600 by Becky Lentz RN  Safety Promotion/Fall Prevention: safety round/check completed  Taken 12/1/2021 1500 by Becky Lentz RN  Safety Promotion/Fall Prevention: safety round/check completed  Taken 12/1/2021 1418 by Becky Lentz RN  Safety Promotion/Fall Prevention: safety round/check completed  Taken 12/1/2021 1300 by Becky Lentz RN  Safety Promotion/Fall Prevention: safety round/check completed  Taken 12/1/2021 1200 by Becky Lentz RN  Safety Promotion/Fall Prevention: safety round/check completed  Taken 12/1/2021 1100 by Becky Lentz RN  Safety Promotion/Fall Prevention: safety round/check completed  Taken 12/1/2021 1000 by Becky Lentz RN  Safety Promotion/Fall Prevention: safety round/check completed  Taken 12/1/2021 0900 by Becky Lentz RN  Safety Promotion/Fall Prevention: safety round/check completed  Taken 12/1/2021 0800 by  Becky Lentz, RN  Safety Promotion/Fall Prevention: safety round/check completed  Intervention: Prevent Skin Injury  Recent Flowsheet Documentation  Taken 12/1/2021 0800 by Becky Lentz RN  Body Position: position changed independently  Intervention: Prevent Infection  Recent Flowsheet Documentation  Taken 12/1/2021 0800 by Becky Lentz, RN  Infection Prevention: rest/sleep promoted  Goal: Optimal Comfort and Wellbeing  Outcome: Ongoing, Progressing  Goal: Readiness for Transition of Care  Outcome: Ongoing, Progressing   Goal Outcome Evaluation:  Plan of Care Reviewed With: patient        Progress: improving  Outcome Summary: patient BP has improved, continue to monitor, voiding well, small lochia.

## 2021-12-01 NOTE — PROGRESS NOTES
" Guero  Vaginal Delivery Progress Note    Subjective   Subjective  Postpartum Day 2: Vaginal Delivery    The patient feels well.  Her pain is well controlled with nonsteroidal anti-inflammatory drugs.   She is ambulating well.  Patient describes her bleeding as thin lochia.    Breastfeeding: declines.    Objective     Objective:  Vital signs (most recent): Blood pressure 132/82, pulse 105, temperature 97.8 °F (36.6 °C), temperature source Oral, resp. rate 18, height 172.7 cm (68\"), weight 118 kg (260 lb), last menstrual period 03/01/2021, SpO2 98 %, not currently breastfeeding.     Vital Signs Range for the last 24 hours  Temperature: Temp:  [97.6 °F (36.4 °C)-98.2 °F (36.8 °C)] 97.8 °F (36.6 °C)   Temp Source: Temp src: Oral   BP: BP: (114-160)/() 132/82   Pulse: Heart Rate:  [] 105   Respirations: Resp:  [18-20] 18   Weight:       Admit Height:  Height: 172.7 cm (68\")    Physical Exam:  General:  no acute distresss.  Abdomen: abdomen is soft without significant tenderness, masses, organomegaly or guarding.  Extremities: normal, atraumatic, no cyanosis, and 2+ edema.       [unfilled]       Lab Results   Component Value Date    ABO AB 11/28/2021    RH Positive 11/28/2021        Lab Results   Component Value Date    HGB 7.6 (L) 12/01/2021    HCT 25.5 (L) 12/01/2021         Assessment/Plan   Assessment & Plan    Insufficient prenatal care      Gisel Mena is Day 2  post-partum  Vaginal, Spontaneous   .      Plan:  Continue current care, HGB 7.6 this am, will start ferrous sulfate 325mg BID. BP improved, pt on labetalol 300mg BID, ESTEFANI labs-negative, pt's urine cx positive for ecoli, pt currently on ampicillin and gentamycin, will start oral macrobid today.      Bisi Kirkland, APRN  12/1/2021  08:42 EST    "

## 2021-12-02 VITALS
DIASTOLIC BLOOD PRESSURE: 77 MMHG | WEIGHT: 260 LBS | BODY MASS INDEX: 39.4 KG/M2 | TEMPERATURE: 97.8 F | RESPIRATION RATE: 20 BRPM | HEART RATE: 106 BPM | SYSTOLIC BLOOD PRESSURE: 138 MMHG | OXYGEN SATURATION: 98 % | HEIGHT: 68 IN

## 2021-12-02 LAB
LAB AP CASE REPORT: NORMAL
PATH REPORT.FINAL DX SPEC: NORMAL

## 2021-12-02 PROCEDURE — 63710000001 DIPHENHYDRAMINE PER 50 MG: Performed by: OBSTETRICS & GYNECOLOGY

## 2021-12-02 PROCEDURE — 25010000002 GENTAMICIN PER 80 MG: Performed by: OBSTETRICS & GYNECOLOGY

## 2021-12-02 RX ORDER — PSEUDOEPHEDRINE HCL 30 MG
100 TABLET ORAL 2 TIMES DAILY PRN
Qty: 40 CAPSULE | Refills: 1 | Status: SHIPPED | OUTPATIENT
Start: 2021-12-02

## 2021-12-02 RX ORDER — IBUPROFEN 800 MG/1
800 TABLET ORAL EVERY 8 HOURS PRN
Qty: 60 TABLET | Refills: 1 | Status: SHIPPED | OUTPATIENT
Start: 2021-12-02

## 2021-12-02 RX ORDER — FERROUS SULFATE 325(65) MG
325 TABLET ORAL 2 TIMES DAILY WITH MEALS
Qty: 60 TABLET | Refills: 1 | Status: SHIPPED | OUTPATIENT
Start: 2021-12-02

## 2021-12-02 RX ORDER — NITROFURANTOIN 25; 75 MG/1; MG/1
100 CAPSULE ORAL EVERY 12 HOURS SCHEDULED
Qty: 12 CAPSULE | Refills: 0 | Status: SHIPPED | OUTPATIENT
Start: 2021-12-02 | End: 2021-12-08

## 2021-12-02 RX ORDER — LABETALOL 300 MG/1
300 TABLET, FILM COATED ORAL EVERY 12 HOURS SCHEDULED
Qty: 60 TABLET | Refills: 0 | Status: SHIPPED | OUTPATIENT
Start: 2021-12-02

## 2021-12-02 RX ADMIN — LABETALOL HYDROCHLORIDE 300 MG: 200 TABLET, FILM COATED ORAL at 00:41

## 2021-12-02 RX ADMIN — DIPHENHYDRAMINE HCL 25 MG: 25 CAPSULE ORAL at 00:47

## 2021-12-02 RX ADMIN — DOCUSATE SODIUM 100 MG: 100 CAPSULE ORAL at 10:41

## 2021-12-02 RX ADMIN — GUAIFENESIN 600 MG: 600 TABLET, EXTENDED RELEASE ORAL at 10:41

## 2021-12-02 RX ADMIN — GENTAMICIN SULFATE 430 MG: 40 INJECTION, SOLUTION INTRAMUSCULAR; INTRAVENOUS at 05:39

## 2021-12-02 RX ADMIN — IBUPROFEN 800 MG: 800 TABLET, FILM COATED ORAL at 00:41

## 2021-12-02 RX ADMIN — NYSTATIN 1 APPLICATION: 100000 OINTMENT TOPICAL at 10:41

## 2021-12-02 RX ADMIN — FERROUS SULFATE TAB 325 MG (65 MG ELEMENTAL FE) 325 MG: 325 (65 FE) TAB at 10:41

## 2021-12-02 RX ADMIN — LABETALOL HYDROCHLORIDE 300 MG: 200 TABLET, FILM COATED ORAL at 10:41

## 2021-12-02 RX ADMIN — NITROFURANTOIN MONOHYDRATE/MACROCRYSTALLINE 100 MG: 25; 75 CAPSULE ORAL at 10:41

## 2021-12-02 NOTE — DISCHARGE INSTRUCTIONS
No lifting pushing or pulling over 10 lbs for 6 weeks.  No tampons douching or intercourse for 6 weeks.  No driving for 2 weeks.  You may shower but no tub baths or submersion in water for 6 weeks.  Notify your doctor for fever, chills, worsening abdominal pain, vaginal bleeding heavier than a period or passing clots, foul odor to your vaginal discharge, swelling in your hands face or feet or visual changes such as blurry or spotty vision. See attached education sheets.

## 2021-12-02 NOTE — DISCHARGE SUMMARY
" Guero  Delivery Discharge Summary    Primary OB Clinician:     EDC: Estimated Date of Delivery: 21    Gestational Age:39w0d    Antepartum complications: none    Date of Delivery: 2021   Time of Delivery: 12:41 AM     Delivered By:  Ana Thorpe     Delivery Type: Vaginal, Spontaneous      Baby: Male  Apgar:  6  @ 1 minute /   Apgar:  8  @ 5 minutes   Weight: 3505 g (7 lb 11.6 oz)    Anesthesia: Epidural      Intrapartum complications: hypertension, postpartum anemia    Subjective   Subjective  Postpartum Day 3: Vaginal Delivery    The patient feels well.  Her pain is well controlled with nonsteroidal anti-inflammatory drugs.   She is ambulating well.  Patient describes her bleeding as thin lochia.  Patient feels much better after receiving blood.     Breastfeeding: declines.    Objective     Objective   Vital Signs Range for the last 24 hours  Temperature: Temp:  [97.8 °F (36.6 °C)-98.6 °F (37 °C)] 97.8 °F (36.6 °C)   Temp Source: Temp src: Oral   BP: BP: (126-161)/() 138/77   Pulse: Heart Rate:  [] 106   Respirations: Resp:  [18-20] 20   Weight:       Admit Height:  Height: 172.7 cm (68\")    Physical Exam:  General:  no acute distresss.  Abdomen: Fundus: appropriate, firm, non tender  Extremities: normal, atraumatic, no cyanosis, and trace edema.     [unfilled]       Lab Results   Component Value Date    ABO AB 2021    RH Positive 2021        Lab Results   Component Value Date    HGB 7.6 (L) 2021    HCT 25.5 (L) 2021         Assesment and Plan:  Postpartum care after vaginal delivery    Ferrous sulfate for hgb <10.  Advised to check BP at home daily.  Call office if greater than 140/90.  Continue labetalol.  Follow-up appointment with [facility] in 1 week for BP check.    Discharge Date: 2021; Discharge Time: 10:13 EST        IONA Lechuga  2021  10:09 EST      "
Oriented - self; Oriented - place; Oriented - time

## 2021-12-03 ENCOUNTER — TELEPHONE (OUTPATIENT)
Dept: OBSTETRICS AND GYNECOLOGY | Facility: HOSPITAL | Age: 31
End: 2021-12-03

## 2021-12-03 NOTE — PAYOR COMM NOTE
"CONTACT:  SCOTT BREWER MSN, APRN  UTILIZATION MANAGEMENT DEPT.  [facility]  [address], [address]  PHONE:  [phone]  FAX: [phone]    PATIENT DISCHARGED TO HOME ON 21, STILL AWAITING INPATIENT AUTHORIZATION    Gisel Mena (31 y.o. Female)             Date of Birth Social Security Number Address Home Phone MRN    [id number]  [id number] [phone] [id number]    Taoist Marital Status             None        Admission Date Admission Type Admitting Provider Attending Provider Department, Room/Bed    21 Elective Ana Thorpe,   [facility]    Discharge Date Discharge Disposition Discharge Destination          2021 Home or Self Care              Attending Provider: (none)   Allergies: No Known Allergies    Isolation: None   Infection: None   Code Status: Prior   Advance Care Planning Activity    Ht: 172.7 cm (68\")   Wt: 118 kg (260 lb)    Admission Cmt: None   Principal Problem: None                Active Insurance as of 2021     Primary Coverage     Payor Plan Insurance Group Employer/Plan Group    [employer] [place] [employer] [place]ICA[place]      Payor Plan Address Payor Plan Phone Number Payor Plan Fax Number Effective Dates    [address] [address] [phone]  3/1/2020 - None Entered    [facility] [address]       Subscriber Name Subscriber Birth Date Member ID       GISEL MENA 1990 [id number]                 Emergency Contacts      (Rel.) Home Phone Work Phone Mobile Phone    Bharati Reddy (Friend) [phone] -- --        DELIVERY INFORMATION:     ADMIT DATE: 2021     DELIVERY DATE AND TIME: 21 @ 0041     DELIVERY TYPE: VAGINAL     GENDER OF BABY: MALE     WEIGHT:  3505 GRAMS     APGARS:  6/8     NURSERY TYPE: WELL BABY     GESTATIONAL AGE: 39/0     EDC: 21     /PARA: 39/0       Discharge Summary      Stacey Bourgeois APRN at 21 1009     " "Attestation signed by Man Moody MD at 21 1021    I have reviewed this documentation and agree.                     Guero  Delivery Discharge Summary    Primary OB Clinician:     EDC: Estimated Date of Delivery: 21    Gestational Age:39w0d    Antepartum complications: none    Date of Delivery: 2021   Time of Delivery: 12:41 AM     Delivered By:  Ana Thorpe     Delivery Type: Vaginal, Spontaneous      Baby: Male  Apgar:  6  @ 1 minute /   Apgar:  8  @ 5 minutes   Weight: 3505 g (7 lb 11.6 oz)    Anesthesia: Epidural      Intrapartum complications: hypertension, postpartum anemia    Subjective   Subjective  Postpartum Day 3: Vaginal Delivery    The patient feels well.  Her pain is well controlled with nonsteroidal anti-inflammatory drugs.   She is ambulating well.  Patient describes her bleeding as thin lochia.  Patient feels much better after receiving blood.     Breastfeeding: declines.    Objective     Objective   Vital Signs Range for the last 24 hours  Temperature: Temp:  [97.8 °F (36.6 °C)-98.6 °F (37 °C)] 97.8 °F (36.6 °C)   Temp Source: Temp src: Oral   BP: BP: (126-161)/() 138/77   Pulse: Heart Rate:  [] 106   Respirations: Resp:  [18-20] 20   Weight:       Admit Height:  Height: 172.7 cm (68\")    Physical Exam:  General:  no acute distresss.  Abdomen: Fundus: appropriate, firm, non tender  Extremities: normal, atraumatic, no cyanosis, and trace edema.     [unfilled]       Lab Results   Component Value Date    ABO AB 2021    RH Positive 2021        Lab Results   Component Value Date    HGB 7.6 (L) 2021    HCT 25.5 (L) 2021         Assesment and Plan:  Postpartum care after vaginal delivery    Ferrous sulfate for hgb <10.  Advised to check BP at home daily.  Call office if greater than 140/90.  Continue labetalol.  Follow-up appointment with [facility] in 1 week for BP check.    Discharge Date: 2021; Discharge Time: 10:13 " EST        Stacey Bourgeois, APRN  12/2/2021  10:09 EST        Electronically signed by Man Moody MD at 12/02/21 1029

## 2021-12-03 NOTE — TELEPHONE ENCOUNTER
Attempted to contact patient via phone number provided. No answer at number. Will try again at later time.

## 2022-05-28 ENCOUNTER — TELEMEDICINE (OUTPATIENT)
Dept: FAMILY MEDICINE CLINIC | Facility: TELEHEALTH | Age: 32
End: 2022-05-28

## 2022-05-28 DIAGNOSIS — N39.0 URINARY TRACT INFECTION WITHOUT HEMATURIA, SITE UNSPECIFIED: Primary | ICD-10-CM

## 2022-05-28 PROCEDURE — 99203 OFFICE O/P NEW LOW 30 MIN: CPT | Performed by: NURSE PRACTITIONER

## 2022-05-28 RX ORDER — SULFAMETHOXAZOLE AND TRIMETHOPRIM 800; 160 MG/1; MG/1
1 TABLET ORAL 2 TIMES DAILY
Qty: 10 TABLET | Refills: 0 | Status: SHIPPED | OUTPATIENT
Start: 2022-05-28 | End: 2022-06-02

## 2022-05-28 RX ORDER — PHENAZOPYRIDINE HYDROCHLORIDE 200 MG/1
200 TABLET, FILM COATED ORAL 3 TIMES DAILY PRN
Qty: 6 TABLET | Refills: 0 | Status: SHIPPED | OUTPATIENT
Start: 2022-05-28 | End: 2022-05-30

## 2022-05-29 NOTE — PROGRESS NOTES
You have chosen to receive care through a telehealth visit.The use of a video visit has been reviewed with the patient and verbal informed consent has been obtained. Video Options: MyChart/Zoom The visit included audio and video interaction. No technical issues occurred during this visit.  Pt Location: Home  Provider location: [place], [place]  Video Visit Reason:   Free Text Description: Pain in lower back and discomfort when urinating      Chief Complaint  Urinary Tract Infection    Dev Mena presents to [facility]            Urinary frequency, urgency with low back pain and blood tinged urine with history of UTIs in the past. No fever, chills, N/V.    Urinary Tract Infection   This is a new problem. The current episode started yesterday. The problem occurs every urination. The problem has been gradually worsening. The quality of the pain is described as burning. There has been no fever. Associated symptoms include flank pain, frequency, hematuria and urgency. Pertinent negatives include no chills, nausea or vomiting. Treatments tried: azo. The treatment provided mild relief.   Review of Systems   Constitutional: Negative for chills and fever.   Gastrointestinal: Negative for nausea and vomiting.   Genitourinary: Positive for dysuria, flank pain, frequency, hematuria and urgency. Negative for difficulty urinating.   Musculoskeletal: Positive for back pain.     Objective   Vital Signs:   There were no vitals taken for this visit.    Physical Exam   Constitutional: She appears well-nourished. No distress.   Pulmonary/Chest: Effort normal.  No respiratory distress.  Abdominal: Soft. She exhibits no distension. There is no abdominal tenderness.     Result Review :                 Assessment and Plan    Diagnoses and all orders for this visit:    1. Urinary tract infection without hematuria, site unspecified (Primary)  -     sulfamethoxazole-trimethoprim (Bactrim DS)  800-160 MG per tablet; Take 1 tablet by mouth 2 (Two) Times a Day for 5 days.  Dispense: 10 tablet; Refill: 0  -     phenazopyridine (Pyridium) 200 MG tablet; Take 1 tablet by mouth 3 (Three) Times a Day As Needed for Bladder Spasms for up to 2 days.  Dispense: 6 tablet; Refill: 0        Increase fluids, anything but caffeine since it is a bladder irritant. Follow up with Urgent Care if symptoms worsen or the treatment provided does not resolve the illness. Go to the nearest Emergency Department for any signs of worsening kidney infection, such as severe back or abdominal pain with or without Nausea/Vomiting, fever, chills, hematuria or difficulty urinating.       I spent 20 minutes caring for Gisel on this date of service. This time includes time spent by me in the following activities:preparing for the visit, obtaining and/or reviewing a separately obtained history, performing a medically appropriate examination and/or evaluation , counseling and educating the patient/family/caregiver, ordering medications, tests, or procedures, and documenting information in the medical record  Follow Up   Return if symptoms worsen or fail to improve.  Patient was given instructions and counseling regarding her condition or for health maintenance advice. Please see specific information pulled into the AVS if appropriate.

## 2022-06-08 ENCOUNTER — OFFICE VISIT (OUTPATIENT)
Dept: PSYCHIATRY | Facility: CLINIC | Age: 32
End: 2022-06-08

## 2022-06-08 DIAGNOSIS — F11.21 OPIOID USE DISORDER, SEVERE, IN EARLY REMISSION: Primary | ICD-10-CM

## 2022-06-08 DIAGNOSIS — F15.21 METHAMPHETAMINE USE DISORDER, SEVERE, IN SUSTAINED REMISSION: ICD-10-CM

## 2022-06-08 DIAGNOSIS — F43.10 PTSD (POST-TRAUMATIC STRESS DISORDER): ICD-10-CM

## 2022-06-08 PROCEDURE — 90791 PSYCH DIAGNOSTIC EVALUATION: CPT | Performed by: SOCIAL WORKER
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
## 2022-06-09 ENCOUNTER — OFFICE VISIT (OUTPATIENT)
Dept: PSYCHIATRY | Facility: HOSPITAL | Age: 32
End: 2022-06-09

## 2022-06-09 DIAGNOSIS — F11.21 OPIOID USE DISORDER, SEVERE, IN EARLY REMISSION: Primary | ICD-10-CM

## 2022-06-09 DIAGNOSIS — F43.10 PTSD (POST-TRAUMATIC STRESS DISORDER): ICD-10-CM

## 2022-06-09 DIAGNOSIS — F15.21 METHAMPHETAMINE USE DISORDER, SEVERE, IN SUSTAINED REMISSION: ICD-10-CM

## 2022-06-09 PROCEDURE — 90792 PSYCH DIAG EVAL W/MED SRVCS: CPT | Performed by: PSYCHIATRY & NEUROLOGY

## 2022-06-09 PROCEDURE — H0015 ALCOHOL AND/OR DRUG SERVICES: HCPCS | Performed by: SOCIAL WORKER

## 2022-06-09 NOTE — PROGRESS NOTES
"[facility] provider [facility] [address] [facility], [place] [address].  The Patient is seen remotely at his/her home, using Zoom. Patient is being seen via telehealth and confirm that they are in a secure environment for this session. The patient's condition being diagnosed/treated is appropriate for telemedicine. The provider identified herself as well as her credentials.   The patient gave consent to be seen remotely, and when consent is given they understand that the consent allows for patient identifiable information to be sent to a third party as needed.   They may refuse to be seen remotely at any time. The electronic data is encrypted and password protected, and the patient has been advised of the potential risks to privacy not withstanding such measures.      NAME: Gisel Mena  DATE: 06/09/2022    [facility]  [id number]    Number of participants: 2    DATA:     Participant reported she'd been doing pretty good for the most part. She'd been discharged from [facility] without an aftercare plan, and it was really stressful at first, being thrown back out into the real world without a plan. Going out into town was hard because she was running into people around [facility]. She'd made numerous efforts to get into a program at [facility], and they had sent her to multiple locations ([place] [address]) without her ever starting a program. She shared with the group about her experience of being discharged from residential treatment. She liked the program, despite how difficult the program had been. It was still difficult for her to talk about the situation because she felt like it was unfair to have been discharged for saying \"This is BS\" under her breath. She had been discharged one month early.     Group 1 (Psychotherapy: Check-ins): Therapist continued facilitation of rapport building strategies between group members. Therapist asked that each patient check in with home life and " recovery efforts and identify triggers, cravings, and high risk situations that arise between group sessions.  Group members discussed barriers and benefits of attending recovery meetings.  Therapist provided empathy and support during group session.       Group 2  (Step One cont.) Discussed unmanageability in addiction and prompted participants to respond by sharing their own experiences.     Group 3 (See note by MHT)     ASSESSMENT:    Personal Assessment 0-10 Scale (10 worst)    Anxiety:  7 (My anxiety kind of stays high everyday. Nothing really has to trigger it. I over-think everything.)  Depression:  0   Cravings: 0     MSE within normal limits? Yes     Pt Response:  open/receptive  Engaged in activity/Process and self-disclosed: Yes    Applies today's group topics to self: Yes    Able to give and receive feedback: Yes    Demonstrated insightful thinking: Yes   Other pt response comments:  Demonstrated pressured speech today.     Community Group Engagement:  NA everyday on [email or web address]    Reported relapse since last meeting?  No  (Sober since November 2021)    .  No visits with results within 3 Week(s) from this visit.   Latest known visit with results is:   Admission on 11/28/2021, Discharged on 12/02/2021   Component Date Value Ref Range Status   • ABO Type 11/28/2021 AB   Final   • RH type 11/28/2021 Positive   Final   • Antibody Screen 11/28/2021 Negative   Final   • T&S Expiration Date 11/28/2021 12/1/2021 11:59:59 PM   Final   • THC, Screen, Urine 11/28/2021 Negative  Negative Final   • Phencyclidine (PCP), Urine 11/28/2021 Negative  Negative Final   • Cocaine Screen, Urine 11/28/2021 Negative  Negative Final   • Methamphetamine, Ur 11/28/2021 Negative  Negative Final   • Opiate Screen 11/28/2021 Negative  Negative Final   • Amphetamine Screen, Urine 11/28/2021 Negative  Negative Final   • Benzodiazepine Screen, Urine 11/28/2021 Negative  Negative Final   • Tricyclic Antidepressants Screen 11/28/2021 Negative   Negative Final   • Methadone Screen, Urine 11/28/2021 Negative  Negative Final   • Barbiturates Screen, Urine 11/28/2021 Negative  Negative Final   • Oxycodone Screen, Urine 11/28/2021 Negative  Negative Final   • Propoxyphene Screen 11/28/2021 Negative  Negative Final   • Buprenorphine, Screen, Urine 11/28/2021 Negative  Negative Final   • WBC 11/28/2021 15.79 (A) 3.40 - 10.80 10*3/mm3 Final   • RBC 11/28/2021 3.56 (A) 3.77 - 5.28 10*6/mm3 Final   • Hemoglobin 11/28/2021 7.5 (A) 12.0 - 15.9 g/dL Final   • Hematocrit 11/28/2021 25.9 (A) 34.0 - 46.6 % Final   • MCV 11/28/2021 72.8 (A) 79.0 - 97.0 fL Final   • MCH 11/28/2021 21.1 (A) 26.6 - 33.0 pg Final   • MCHC 11/28/2021 29.0 (A) 31.5 - 35.7 g/dL Final   • RDW 11/28/2021 16.7 (A) 12.3 - 15.4 % Final   • RDW-SD 11/28/2021 43.9  37.0 - 54.0 fl Final   • MPV 11/28/2021 10.1  6.0 - 12.0 fL Final   • Platelets 11/28/2021 313  140 - 450 10*3/mm3 Final   • Neutrophil % 11/28/2021 79.5 (A) 42.7 - 76.0 % Final   • Lymphocyte % 11/28/2021 12.1 (A) 19.6 - 45.3 % Final   • Monocyte % 11/28/2021 6.7  5.0 - 12.0 % Final   • Eosinophil % 11/28/2021 0.1 (A) 0.3 - 6.2 % Final   • Basophil % 11/28/2021 0.4  0.0 - 1.5 % Final   • Immature Grans % 11/28/2021 1.2 (A) 0.0 - 0.5 % Final   • Neutrophils, Absolute 11/28/2021 12.55 (A) 1.70 - 7.00 10*3/mm3 Final   • Lymphocytes, Absolute 11/28/2021 1.91  0.70 - 3.10 10*3/mm3 Final   • Monocytes, Absolute 11/28/2021 1.06 (A) 0.10 - 0.90 10*3/mm3 Final   • Eosinophils, Absolute 11/28/2021 0.01  0.00 - 0.40 10*3/mm3 Final   • Basophils, Absolute 11/28/2021 0.07  0.00 - 0.20 10*3/mm3 Final   • Immature Grans, Absolute 11/28/2021 0.19 (A) 0.00 - 0.05 10*3/mm3 Final   • nRBC 11/28/2021 0.0  0.0 - 0.2 /100 WBC Final   • COVID19 11/28/2021 Not Detected  Not Detected - Ref. Range Final   • Glucose 11/28/2021 111 (A) 65 - 99 mg/dL Final   • BUN 11/28/2021 5 (A) 6 - 20 mg/dL Final   • Creatinine 11/28/2021 0.71  0.57 - 1.00 mg/dL Final   • Sodium  11/28/2021 135 (A) 136 - 145 mmol/L Final   • Potassium 11/28/2021 3.5  3.5 - 5.2 mmol/L Final   • Chloride 11/28/2021 102  98 - 107 mmol/L Final   • CO2 11/28/2021 21.0 (A) 22.0 - 29.0 mmol/L Final   • Calcium 11/28/2021 7.7 (A) 8.6 - 10.5 mg/dL Final   • Total Protein 11/28/2021 6.6  6.0 - 8.5 g/dL Final   • Albumin 11/28/2021 2.99 (A) 3.50 - 5.20 g/dL Final   • ALT (SGPT) 11/28/2021 8  1 - 33 U/L Final   • AST (SGOT) 11/28/2021 11  1 - 32 U/L Final   • Alkaline Phosphatase 11/28/2021 148 (A) 39 - 117 U/L Final   • Total Bilirubin 11/28/2021 0.2  0.0 - 1.2 mg/dL Final   • eGFR Non African Amer 11/28/2021 96  >60 mL/min/1.73 Final   • Globulin 11/28/2021 3.6  gm/dL Final   • A/G Ratio 11/28/2021 0.8  g/dL Final   • BUN/Creatinine Ratio 11/28/2021 7.0  7.0 - 25.0 Final   • Anion Gap 11/28/2021 12.0  5.0 - 15.0 mmol/L Final   • Uric Acid 11/28/2021 4.3  2.4 - 5.7 mg/dL Final   • Chlamydia DNA by PCR 11/28/2021 Not Detected  Not Detected Final   • Neisseria gonorrhoeae by PCR 11/28/2021 Not Detected  Not Detected Final   • Trichomonas vaginalis PCR 11/28/2021 Not Detected  Not Detected, Invalid Final   • HIV-1/ HIV-2 11/28/2021 Non-Reactive  Non-Reactive Final    A non-reactive test result does not preclude the possibility of exposure to HIV or infection with HIV. An antibody response to recent exposure may take several months to reach detectable levels.   • Hepatitis C Ab 11/28/2021 Reactive (A) Non-Reactive Final   • Hepatitis B Surface Ag 11/28/2021 Non-Reactive  Non-Reactive Final   • Rubella Antibodies, IgG 11/28/2021 2.24  Immune >0.99 index Final                                    Non-immune       <0.90                                  Equivocal  0.90 - 0.99                                  Immune           >0.99   • RPR 11/28/2021 Non-Reactive  Non-Reactive Final   • ESTEFANI Direct 11/29/2021 Negative  Negative Final   • Case Report 11/29/2021    Final                    Value:Surgical Pathology Report                          Case: [id number]                                  Authorizing Provider:  Ana Thorpe DO       Collected:           11/29/2021 01:43 AM          Ordering Location:     [facility]      Received:            11/29/2021 12:59 PM                                 LABOR DELIVERY                                                               Pathologist:           Estephania Haddad MD                                                       Specimen:    Placenta                                                                                  • Final Diagnosis 11/29/2021    Final                    Value:This result contains rich text formatting which cannot be displayed here.   • Color, UA 11/29/2021 Yellow  Yellow, Straw Final   • Appearance, UA 11/29/2021 Hazy (A) Clear Final   • pH, UA 11/29/2021 6.5  5.0 - 8.0 Final   • Specific Gravity, UA 11/29/2021 1.016  1.005 - 1.030 Final   • Glucose, UA 11/29/2021 Negative  Negative Final   • Ketones, UA 11/29/2021 Negative  Negative Final   • Bilirubin, UA 11/29/2021 Negative  Negative Final   • Blood, UA 11/29/2021 Moderate (2+) (A) Negative Final   • Protein, UA 11/29/2021 >=300 mg/dL (3+) (A) Negative Final   • Leuk Esterase, UA 11/29/2021 Moderate (2+) (A) Negative Final   • Nitrite, UA 11/29/2021 Negative  Negative Final   • Urobilinogen, UA 11/29/2021 0.2 E.U./dL  0.2 - 1.0 E.U./dL Final   • RBC, UA 11/29/2021 31-50 (A) None Seen, 0-2 /HPF Final   • WBC, UA 11/29/2021 Too Numerous to Count (A) None Seen, 0-2 /HPF Final   • Bacteria, UA 11/29/2021 1+ (A) None Seen /HPF Final   • Squamous Epithelial Cells, UA 11/29/2021 0-2  None Seen, 0-2 /HPF Final   • Hyaline Casts, UA 11/29/2021 0-2  None Seen /LPF Final   • Methodology 11/29/2021 Manual Light Microscopy   Final   • Urine Culture 11/29/2021 25,000 CFU/mL Escherichia coli (A)  Final   • Hemoglobin 11/30/2021 5.0 (A) 12.0 - 15.9 g/dL Final   • Hematocrit 11/30/2021 18.3 (A) 34.0 - 46.6 % Final   •  Product Code 12/01/2021 B0915C96   Final   • Unit Number 12/01/2021 [id number]   Final   • UNIT  ABO 12/01/2021 AB   Final   • UNIT  RH 12/01/2021 POS   Final   • Crossmatch Interpretation 12/01/2021 Compatible   Final   • Dispense Status 12/01/2021 PT   Final   • Blood Expiration Date 12/01/2021 202112222359   Final   • Blood Type Barcode 12/01/2021 8400   Final   • Product Code 12/01/2021 [id number]   Final   • Unit Number 12/01/2021 [id number]   Final   • UNIT  ABO 12/01/2021 AB   Final   • UNIT  RH 12/01/2021 POS   Final   • Crossmatch Interpretation 12/01/2021 Compatible   Final   • Dispense Status 12/01/2021 PT   Final   • Blood Expiration Date 12/01/2021 [id number]   Final   • Blood Type Barcode 12/01/2021 8400   Final   • Hemoglobin 11/30/2021 7.5 (A) 12.0 - 15.9 g/dL Final    Post Transfusion Specimen     • Hematocrit 11/30/2021 25.2 (A) 34.0 - 46.6 % Final   • Gentamicin Trough 12/01/2021 0.30 (A) 0.50 - 2.00 mcg/mL Final   • Glucose 12/01/2021 85  65 - 99 mg/dL Final   • BUN 12/01/2021 8  6 - 20 mg/dL Final   • Creatinine 12/01/2021 0.71  0.57 - 1.00 mg/dL Final   • Sodium 12/01/2021 136  136 - 145 mmol/L Final   • Potassium 12/01/2021 4.5  3.5 - 5.2 mmol/L Final   • Chloride 12/01/2021 102  98 - 107 mmol/L Final   • CO2 12/01/2021 23.4  22.0 - 29.0 mmol/L Final   • Calcium 12/01/2021 7.8 (A) 8.6 - 10.5 mg/dL Final   • eGFR Non  Amer 12/01/2021 96  >60 mL/min/1.73 Final   • BUN/Creatinine Ratio 12/01/2021 11.3  7.0 - 25.0 Final   • Anion Gap 12/01/2021 10.6  5.0 - 15.0 mmol/L Final   • WBC 12/01/2021 15.57 (A) 3.40 - 10.80 10*3/mm3 Final   • RBC 12/01/2021 3.40 (A) 3.77 - 5.28 10*6/mm3 Final   • Hemoglobin 12/01/2021 7.6 (A) 12.0 - 15.9 g/dL Final   • Hematocrit 12/01/2021 25.5 (A) 34.0 - 46.6 % Final   • MCV 12/01/2021 75.0 (A) 79.0 - 97.0 fL Final   • MCH 12/01/2021 22.4 (A) 26.6 - 33.0 pg Final   • MCHC 12/01/2021 29.8 (A) 31.5 - 35.7 g/dL Final   • RDW 12/01/2021 18.2 (A) 12.3 - 15.4  % Final   • RDW-SD 12/01/2021 48.5  37.0 - 54.0 fl Final   • MPV 12/01/2021 10.3  6.0 - 12.0 fL Final   • Platelets 12/01/2021 338  140 - 450 10*3/mm3 Final       Impression/Formulation:    ICD-10-CM ICD-9-CM   1. Opioid use disorder, severe, in early remission (HCC)  F11.21 304.03   2. Methamphetamine use disorder, severe, in sustained remission (HCC)  F15.21 304.43   3. PTSD (post-traumatic stress disorder)  F43.10 309.81        CLINICAL MANEUVERING/INTERVENTIONS: Therapist utilized a person-centered approach to build and maintain rapport with group member.   Therapist promoted safe nonjudgmental environment by providing group members with unconditional positive regard.  Assisted member in processing above session content; acknowledged and normalized patient's thoughts, feelings and concerns.  Allowed patient to freely discuss issues without interruption or judgment. Provided safe, confidential environment to facilitate the development of positive therapeutic relationship and encourage open, honest communication. Therapist assisted group member with identifying and implementing healthier coping strategies and maintaining healthier boundaries. Assisted patient in identifying risk factors which would indicate the need for higher level of care including thoughts to harm self or others and/or self-harming behavior and encouraged patient to contact this office, call 911, or present to the nearest emergency room should any of these events occur. Pt agreeable to adhere to medication regimen as prescribed and report any side effects.  Discussed crisis intervention services and means to access.  Patient adamantly and convincingly denies current suicidal or homicidal ideation or perceptual disturbance.      Therapist implemented motivational interviewing techniques to assist client with exploring and resolving ambivalence associated with commitment to change behaviors.  Yes  Therapist utilized dialectical behavior  techniques to teach and model emotional regulation and relaxation.  No   Therapist applied cognitive behavioral strategies to facilitate identification of maladaptive patterns of thinking and behavior.  Yes     PLAN:    Continue [facility]  Aftercare:  [facility]  Outpatient Phase 2     Please note that portions of this note were completed with a voice recognition program. Efforts were made to edit dictation, but occasionally words are mistranscribed.     This document signed by Joe Ruth LCSW, June 9, 2022, 13:31 EDT

## 2022-06-09 NOTE — PROGRESS NOTES
Adult IOP Group      Date: June 9, 2022  Time: 1 HOUR    Type of Group:  Psychoeducation    Group  Content: Step 1     Patient Response: Patient stated surrender for her means letting go. Patient states a DCBS case against her really woke her up and motivated her to start doing better for her child and eventually she even wanted to do better for herself. Patient appears to have very good insight pertaining to the concept of surrender in the 1st step of the 12 steps. Patient states she feels like the first step is one of the most eye opening steps and became her foundation during her inpatient rehabilitation. Patient remained engaged during the entire group.         Juwan Langford  06/09/22  10:38 EDT
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
## 2022-06-10 ENCOUNTER — DOCUMENTATION (OUTPATIENT)
Dept: PSYCHIATRY | Facility: CLINIC | Age: 32
End: 2022-06-10

## 2022-06-10 NOTE — PROGRESS NOTES
[address] [address]  [phone]    Intensive Outpatient Program for Chemical Dependence (CD IOP)      Individualized Treatment Plan    DATE: 06/10/2022  TIME: 1610         Long Term Goal:  Jessica will establish a sustained recovery and will maintain total abstinence from mind-altering substances other than what is prescribed and/or approved by the attending physician. Pt will learn, practice, and utilize behavioral and cognitive coping skills to help maintain sobriety.    Patient Care Needs:  Jessica presents with Opioid Use Disorder and Methamphetamine Use Disorder, both in early remission, and is at high risk for relapse without continued treatment.  Her support system is limited, and she intends for [facility] to support her while she looks to expand her social supports. Pt has a history of using drugs/alcohol until intoxicated or passed out and has been unable to stop or cut down use of alcohol/drugs once starting, despite verbalized desire to do so, and the negative consequences from continued use.  Pt's use has negatively impacted social, occupational, and/or physical functioning.    Objectives:      1.  Patient will participate in a medical evaluation to assess the effects of chemical dependence and will cooperate with an evaluation by the attending psychiatrist or psychiatric nurse practitioner for psychotropic medication if appropriate.    2.  Patient will adhere to the IOP group rules and guidelines.    3.  Patient will attend group sessions as scheduled.  Patient will notify therapist and support staff of any absences or tardies.  Patient will provide a valid and verifiable excuse for absences to be considered excused. She agreed to attend two meetings each week in person, and two meetings each week virtually.    4.  Patient will participate in random drug and alcohol screenings and will exhibit negative results on said screenings.    5.  Patient will  identify high risk situations, people, and places to avoid or manage in order to maintain sobriety.    6.  Patient will participate in group discussions by giving and receiving feedback appropriately.    7.  Patient will develop a positive network of support by attending recovery groups or other spiritual fellowships each week outside of IOP group and by exploring/obtaining/maintaining sponsorship.    8.  Patient will identify, practice, and implement at least 5 healthy coping strategies to manage difficult emotions while remaining abstinent.    9.  Patient will develop relapse prevention skills and be able to identify and share them with the group in the form of a relapse prevention plan.    10.  Patient will develop a personal recovery plan and share it with the group prior to discharge.    11.  Patient will offer family participation in family education groups, if appropriate.    12.  Patient will exhibit increased motivation to change as assessed by observable behaviors in conjunction with the ASAM assessment tool.    13.  Patient will utilize healthy coping skills to manage depressive and anxious symptoms without using alcohol or other mind-altering substances and will exhibit decreased score on the PHQ-9 and SINDI-7.    Interventions:  Patient will attend biweekly appointments with the attending psychiatrist or psychiatric nurse practitioner for medication evaluation and management unless scheduled otherwise.  Patient will be referred to a medical provider for a physical examination if he/she is not already established with a medical provider.  Patient will comply with recommendations and appointments with his/her medical treatment team, including medication management.  Therapist will introduce and review IOP group rules, and patient will be provided a hard copy of the group rules upon entering the program.  IOP group sessions will be offered 3-4 times per week, Mondays, Tuesdays, Wednesdays and Thursdays  "during Phase I of treatment, with the exception of some federal holidays. Patient will be provided with random and regular drug and alcohol screenings. Therapist will utilize motivational interviewing techniques to assist patient with exploring and resolving ambivalence associated with commitment to change behaviors related to substance use and addiction.  Therapist will utilize behavioral and cognitive techniques to assist patient with identifying and recognizing patterns of irrational beliefs that contribute to patient's risk for continued substance abuse and relapse.  Therapist will provide psychoeducation to assist patient in increasing knowledge on the disease concept and the effects of chemical dependence.  Therapist will provide and utilize evidenced-based recovery literature to assist patient in identifying healthier coping strategies.  Therapist will assist patient in developing a relapse prevention plan and a personal recovery plan.  Therapist will assist patient with exploring self-help strategies and beginning work on the 12 Steps.  Therapist will encourage patient to explore, obtain, or maintain sponsorship and/or interpersonal connection with sober community fellowships.  Therapist will offer family education groups, if appropriate, and will encourage patient to invite family member(s) to participate. Treatment team members will provide patient with progress reports as needed.    Team Members:  Patient - Gisel \"Jessica\" Mena  Medical Provider - David Damian MD  [facility] Licensed Therapist - Joe Ruth LCSW  [facility] Director - Jose Burroughs LCSW, [facility]  Support Staff    "

## 2022-06-13 ENCOUNTER — APPOINTMENT (OUTPATIENT)
Dept: PSYCHIATRY | Facility: HOSPITAL | Age: 32
End: 2022-06-13

## 2022-06-15 ENCOUNTER — OFFICE VISIT (OUTPATIENT)
Dept: PSYCHIATRY | Facility: HOSPITAL | Age: 32
End: 2022-06-15

## 2022-06-15 DIAGNOSIS — F11.21 OPIOID USE DISORDER, SEVERE, IN EARLY REMISSION: Primary | ICD-10-CM

## 2022-06-15 DIAGNOSIS — F15.21 METHAMPHETAMINE USE DISORDER, SEVERE, IN SUSTAINED REMISSION: ICD-10-CM

## 2022-06-15 DIAGNOSIS — F43.10 PTSD (POST-TRAUMATIC STRESS DISORDER): ICD-10-CM

## 2022-06-15 PROCEDURE — H0015 ALCOHOL AND/OR DRUG SERVICES: HCPCS | Performed by: SOCIAL WORKER

## 2022-06-15 NOTE — PROGRESS NOTES
"This provider is located at [facility] located at [address], [place] [address].  The Patient is seen remotely at his/her home, using Zoom. Patient is being seen via telehealth and confirm that they are in a secure environment for this session. The patient's condition being diagnosed/treated is appropriate for telemedicine. The provider identified herself as well as her credentials.   The patient gave consent to be seen remotely, and when consent is given they understand that the consent allows for patient identifiable information to be sent to a third party as needed.   They may refuse to be seen remotely at any time. The electronic data is encrypted and password protected, and the patient has been advised of the potential risks to privacy not withstanding such measures.      NAME: Gisel Mena  DATE: 06/15/2022    [facility]  [phone]    Number of participants: 4    DATA:     Participant reported she had been stressed lately because her 14-year-old son had gotten in some trouble at school and had an upcoming court appointment. She also had her [employer] case plan that she was working. Transportation was a significant barrier. Her anxiety was \"through the roof\" lately because she was worried she would mess something up and get in trouble. She felt like she was walking on egg shells, but she had not been told by anyone that she was going to get in trouble and be punished. She was also disappointed in herself for being so stressed and for missing the two meetings this week.     Some of her anxiety was related to previous experiences with DCBS. She shared that on previous occasions they had denied her custody of her children for no apparent reason. She seemed to be waiting for a similar event to occur with her current case. This was despite her belief that she was doing everything she needed to do in order to comply with the case plan.    Suggested that struggles with maintaining " "organization was creating anxiety. Patient mildly receptive. Therapist suggested that disorganization was, perhaps, the \"disease\" in this case, while anxiety was a \"symptom.\" Patient had been sharing that her nightly NA meetings were no longer helping because the anxiety was consistently at a high level.    She asked about [facility]. The patient had been under the impression that [facility] could not transport her to the hospital for IOP. She also asked about her medications that were ordered on Thursday. She also mentioned that she hadn't yet contacted the provider at [facility] in [place] to have her send something to Dr. Damian about the dosage of Wellbutrin. The aforementioned items seemed to be evidence that the patient's anxiety was partially related to disorganization in certain areas. The patient's motivation to change these things seemed to have been poor, but it was clearly improving, as evidenced by her efforts to attend group today. (Kendrick MHT, assisted today in arranging [facility] for transportation next week. He also assisted in contacting [facility] in [place] to obtain the medication records requested by Dr. Damian. Patient signed KAREN).     Group 1 (Psychotherapy: Check-ins): Therapist continued facilitation of rapport building strategies between group members. Therapist asked that each patient check in with home life and recovery efforts and identify triggers, cravings, and high risk situations that arise between group sessions.  Group members discussed barriers and benefits of attending recovery meetings.  Therapist provided empathy and support during group session.       Group 2  (Values in the Midst of Suffering) Continued conversation about values from yesterday's group session. Read from Man's Search for Meaning (1959) by Shane Jacob, choosing the passage that emphasized how the only thing that cannot be taken away from us is the choice of how to react to our circumstances. Emphasized that we are not " entirely victims of our circumstances. Engaged participants in discussion about transcending adverse circumstances.    Group 3 (Emotional Intelligence) Viewed the video You aren't at the [facility] Your Emotions - Your Brain Creates Them (BRODERICK, 2018) with Francoise Davis on YouTube. Paused the video periodically to discuss the concepts being discussed and allow participants time to process the ideas being conveyed. Emphasized the importance of emotional intelligence for the purpose of adapting more easily to adverse circumstances.     ASSESSMENT:    Personal Assessment 0-10 Scale (10 worst)    Anxiety:  10   Depression:  4   Cravings: 0     MSE within normal limits? No. Anxious and distressed today  Pt Response:  guarded, defensive and ambivalent  Engaged in activity/Process and self-disclosed: Yes    Applies today's group topics to self: Yes    Able to give and receive feedback: Yes  (fair)  Demonstrated insightful thinking: Yes (fair)  Other pt response comments:  Participant seemed to have been directing her efforts toward alleviation of anxiety in suboptimal directions. She seemed to gain some insight into this as we discussed her situation in group today.     Community Group Engagement:  She was doing one virtual meeting every night    Reported relapse since last meeting?  No      .  No visits with results within 3 Week(s) from this visit.   Latest known visit with results is:   Admission on 11/28/2021, Discharged on 12/02/2021   Component Date Value Ref Range Status   • ABO Type 11/28/2021 AB   Final   • RH type 11/28/2021 Positive   Final   • Antibody Screen 11/28/2021 Negative   Final   • T&S Expiration Date 11/28/2021 12/1/2021 11:59:59 PM   Final   • THC, Screen, Urine 11/28/2021 Negative  Negative Final   • Phencyclidine (PCP), Urine 11/28/2021 Negative  Negative Final   • Cocaine Screen, Urine 11/28/2021 Negative  Negative Final   • Methamphetamine, Ur 11/28/2021 Negative  Negative Final   • Opiate  Screen 11/28/2021 Negative  Negative Final   • Amphetamine Screen, Urine 11/28/2021 Negative  Negative Final   • Benzodiazepine Screen, Urine 11/28/2021 Negative  Negative Final   • Tricyclic Antidepressants Screen 11/28/2021 Negative  Negative Final   • Methadone Screen, Urine 11/28/2021 Negative  Negative Final   • Barbiturates Screen, Urine 11/28/2021 Negative  Negative Final   • Oxycodone Screen, Urine 11/28/2021 Negative  Negative Final   • Propoxyphene Screen 11/28/2021 Negative  Negative Final   • Buprenorphine, Screen, Urine 11/28/2021 Negative  Negative Final   • WBC 11/28/2021 15.79 (A) 3.40 - 10.80 10*3/mm3 Final   • RBC 11/28/2021 3.56 (A) 3.77 - 5.28 10*6/mm3 Final   • Hemoglobin 11/28/2021 7.5 (A) 12.0 - 15.9 g/dL Final   • Hematocrit 11/28/2021 25.9 (A) 34.0 - 46.6 % Final   • MCV 11/28/2021 72.8 (A) 79.0 - 97.0 fL Final   • MCH 11/28/2021 21.1 (A) 26.6 - 33.0 pg Final   • MCHC 11/28/2021 29.0 (A) 31.5 - 35.7 g/dL Final   • RDW 11/28/2021 16.7 (A) 12.3 - 15.4 % Final   • RDW-SD 11/28/2021 43.9  37.0 - 54.0 fl Final   • MPV 11/28/2021 10.1  6.0 - 12.0 fL Final   • Platelets 11/28/2021 313  140 - 450 10*3/mm3 Final   • Neutrophil % 11/28/2021 79.5 (A) 42.7 - 76.0 % Final   • Lymphocyte % 11/28/2021 12.1 (A) 19.6 - 45.3 % Final   • Monocyte % 11/28/2021 6.7  5.0 - 12.0 % Final   • Eosinophil % 11/28/2021 0.1 (A) 0.3 - 6.2 % Final   • Basophil % 11/28/2021 0.4  0.0 - 1.5 % Final   • Immature Grans % 11/28/2021 1.2 (A) 0.0 - 0.5 % Final   • Neutrophils, Absolute 11/28/2021 12.55 (A) 1.70 - 7.00 10*3/mm3 Final   • Lymphocytes, Absolute 11/28/2021 1.91  0.70 - 3.10 10*3/mm3 Final   • Monocytes, Absolute 11/28/2021 1.06 (A) 0.10 - 0.90 10*3/mm3 Final   • Eosinophils, Absolute 11/28/2021 0.01  0.00 - 0.40 10*3/mm3 Final   • Basophils, Absolute 11/28/2021 0.07  0.00 - 0.20 10*3/mm3 Final   • Immature Grans, Absolute 11/28/2021 0.19 (A) 0.00 - 0.05 10*3/mm3 Final   • nRBC 11/28/2021 0.0  0.0 - 0.2 /100 WBC Final    • COVID19 11/28/2021 Not Detected  Not Detected - Ref. Range Final   • Glucose 11/28/2021 111 (A) 65 - 99 mg/dL Final   • BUN 11/28/2021 5 (A) 6 - 20 mg/dL Final   • Creatinine 11/28/2021 0.71  0.57 - 1.00 mg/dL Final   • Sodium 11/28/2021 135 (A) 136 - 145 mmol/L Final   • Potassium 11/28/2021 3.5  3.5 - 5.2 mmol/L Final   • Chloride 11/28/2021 102  98 - 107 mmol/L Final   • CO2 11/28/2021 21.0 (A) 22.0 - 29.0 mmol/L Final   • Calcium 11/28/2021 7.7 (A) 8.6 - 10.5 mg/dL Final   • Total Protein 11/28/2021 6.6  6.0 - 8.5 g/dL Final   • Albumin 11/28/2021 2.99 (A) 3.50 - 5.20 g/dL Final   • ALT (SGPT) 11/28/2021 8  1 - 33 U/L Final   • AST (SGOT) 11/28/2021 11  1 - 32 U/L Final   • Alkaline Phosphatase 11/28/2021 148 (A) 39 - 117 U/L Final   • Total Bilirubin 11/28/2021 0.2  0.0 - 1.2 mg/dL Final   • eGFR Non African Amer 11/28/2021 96  >60 mL/min/1.73 Final   • Globulin 11/28/2021 3.6  gm/dL Final   • A/G Ratio 11/28/2021 0.8  g/dL Final   • BUN/Creatinine Ratio 11/28/2021 7.0  7.0 - 25.0 Final   • Anion Gap 11/28/2021 12.0  5.0 - 15.0 mmol/L Final   • Uric Acid 11/28/2021 4.3  2.4 - 5.7 mg/dL Final   • Chlamydia DNA by PCR 11/28/2021 Not Detected  Not Detected Final   • Neisseria gonorrhoeae by PCR 11/28/2021 Not Detected  Not Detected Final   • Trichomonas vaginalis PCR 11/28/2021 Not Detected  Not Detected, Invalid Final   • HIV-1/ HIV-2 11/28/2021 Non-Reactive  Non-Reactive Final    A non-reactive test result does not preclude the possibility of exposure to HIV or infection with HIV. An antibody response to recent exposure may take several months to reach detectable levels.   • Hepatitis C Ab 11/28/2021 Reactive (A) Non-Reactive Final   • Hepatitis B Surface Ag 11/28/2021 Non-Reactive  Non-Reactive Final   • Rubella Antibodies, IgG 11/28/2021 2.24  Immune >0.99 index Final                                    Non-immune       <0.90                                  Equivocal  0.90 - 0.99                                   Immune           >0.99   • RPR 11/28/2021 Non-Reactive  Non-Reactive Final   • ESTEFANI Direct 11/29/2021 Negative  Negative Final   • Case Report 11/29/2021    Final                    Value:Surgical Pathology Report                         Case: [id number]                                  Authorizing Provider:  Ana Thorpe DO       Collected:           11/29/2021 01:43 AM          Ordering Location:     [facility]      Received:            11/29/2021 12:59 PM                                 LABOR DELIVERY                                                               Pathologist:           Estephania Haddad MD                                                       Specimen:    Placenta                                                                                  • Final Diagnosis 11/29/2021    Final                    Value:This result contains rich text formatting which cannot be displayed here.   • Color, UA 11/29/2021 Yellow  Yellow, Straw Final   • Appearance, UA 11/29/2021 Hazy (A) Clear Final   • pH, UA 11/29/2021 6.5  5.0 - 8.0 Final   • Specific Gravity, UA 11/29/2021 1.016  1.005 - 1.030 Final   • Glucose, UA 11/29/2021 Negative  Negative Final   • Ketones, UA 11/29/2021 Negative  Negative Final   • Bilirubin, UA 11/29/2021 Negative  Negative Final   • Blood, UA 11/29/2021 Moderate (2+) (A) Negative Final   • Protein, UA 11/29/2021 >=300 mg/dL (3+) (A) Negative Final   • Leuk Esterase, UA 11/29/2021 Moderate (2+) (A) Negative Final   • Nitrite, UA 11/29/2021 Negative  Negative Final   • Urobilinogen, UA 11/29/2021 0.2 E.U./dL  0.2 - 1.0 E.U./dL Final   • RBC, UA 11/29/2021 31-50 (A) None Seen, 0-2 /HPF Final   • WBC, UA 11/29/2021 Too Numerous to Count (A) None Seen, 0-2 /HPF Final   • Bacteria, UA 11/29/2021 1+ (A) None Seen /HPF Final   • Squamous Epithelial Cells, UA 11/29/2021 0-2  None Seen, 0-2 /HPF Final   • Hyaline Casts, UA 11/29/2021 0-2  None Seen /LPF Final   • Methodology  11/29/2021 Manual Light Microscopy   Final   • Urine Culture 11/29/2021 25,000 CFU/mL Escherichia coli (A)  Final   • Hemoglobin 11/30/2021 5.0 (A) 12.0 - 15.9 g/dL Final   • Hematocrit 11/30/2021 18.3 (A) 34.0 - 46.6 % Final   • Product Code 12/01/2021 [id number]   Final   • Unit Number 12/01/2021 [id number]   Final   • UNIT  ABO 12/01/2021 AB   Final   • UNIT  RH 12/01/2021 POS   Final   • Crossmatch Interpretation 12/01/2021 Compatible   Final   • Dispense Status 12/01/2021 PT   Final   • Blood Expiration Date 12/01/2021 202112222359   Final   • Blood Type Barcode 12/01/2021 8400   Final   • Product Code 12/01/2021 [id number]   Final   • Unit Number 12/01/2021 [id number]   Final   • UNIT  ABO 12/01/2021 AB   Final   • UNIT  RH 12/01/2021 POS   Final   • Crossmatch Interpretation 12/01/2021 Compatible   Final   • Dispense Status 12/01/2021 PT   Final   • Blood Expiration Date 12/01/2021 202112162359   Final   • Blood Type Barcode 12/01/2021 8400   Final   • Hemoglobin 11/30/2021 7.5 (A) 12.0 - 15.9 g/dL Final    Post Transfusion Specimen     • Hematocrit 11/30/2021 25.2 (A) 34.0 - 46.6 % Final   • Gentamicin Trough 12/01/2021 0.30 (A) 0.50 - 2.00 mcg/mL Final   • Glucose 12/01/2021 85  65 - 99 mg/dL Final   • BUN 12/01/2021 8  6 - 20 mg/dL Final   • Creatinine 12/01/2021 0.71  0.57 - 1.00 mg/dL Final   • Sodium 12/01/2021 136  136 - 145 mmol/L Final   • Potassium 12/01/2021 4.5  3.5 - 5.2 mmol/L Final   • Chloride 12/01/2021 102  98 - 107 mmol/L Final   • CO2 12/01/2021 23.4  22.0 - 29.0 mmol/L Final   • Calcium 12/01/2021 7.8 (A) 8.6 - 10.5 mg/dL Final   • eGFR Non  Amer 12/01/2021 96  >60 mL/min/1.73 Final   • BUN/Creatinine Ratio 12/01/2021 11.3  7.0 - 25.0 Final   • Anion Gap 12/01/2021 10.6  5.0 - 15.0 mmol/L Final   • WBC 12/01/2021 15.57 (A) 3.40 - 10.80 10*3/mm3 Final   • RBC 12/01/2021 3.40 (A) 3.77 - 5.28 10*6/mm3 Final   • Hemoglobin 12/01/2021 7.6 (A) 12.0 - 15.9 g/dL Final   • Hematocrit  12/01/2021 25.5 (A) 34.0 - 46.6 % Final   • MCV 12/01/2021 75.0 (A) 79.0 - 97.0 fL Final   • MCH 12/01/2021 22.4 (A) 26.6 - 33.0 pg Final   • MCHC 12/01/2021 29.8 (A) 31.5 - 35.7 g/dL Final   • RDW 12/01/2021 18.2 (A) 12.3 - 15.4 % Final   • RDW-SD 12/01/2021 48.5  37.0 - 54.0 fl Final   • MPV 12/01/2021 10.3  6.0 - 12.0 fL Final   • Platelets 12/01/2021 338  140 - 450 10*3/mm3 Final       Impression/Formulation:    ICD-10-CM ICD-9-CM   1. Opioid use disorder, severe, in early remission (HCC)  F11.21 304.03   2. Methamphetamine use disorder, severe, in sustained remission (HCC)  F15.21 304.43   3. PTSD (post-traumatic stress disorder)  F43.10 309.81        CLINICAL MANEUVERING/INTERVENTIONS: Therapist utilized a person-centered approach to build and maintain rapport with group member.   Therapist promoted safe nonjudgmental environment by providing group members with unconditional positive regard.  Assisted member in processing above session content; acknowledged and normalized patient's thoughts, feelings and concerns.  Allowed patient to freely discuss issues without interruption or judgment. Provided safe, confidential environment to facilitate the development of positive therapeutic relationship and encourage open, honest communication. Therapist assisted group member with identifying and implementing healthier coping strategies and maintaining healthier boundaries. Assisted patient in identifying risk factors which would indicate the need for higher level of care including thoughts to harm self or others and/or self-harming behavior and encouraged patient to contact this office, call 911, or present to the nearest emergency room should any of these events occur. Pt agreeable to adhere to medication regimen as prescribed and report any side effects.  Discussed crisis intervention services and means to access.  Patient adamantly and convincingly denies current suicidal or homicidal ideation or perceptual  disturbance.      Therapist implemented motivational interviewing techniques to assist client with exploring and resolving ambivalence associated with commitment to change behaviors.  Yes  Therapist utilized dialectical behavior techniques to teach and model emotional regulation and relaxation.  Yes   Therapist applied cognitive behavioral strategies to facilitate identification of maladaptive patterns of thinking and behavior.  Yes     PLAN:    Continue [facility] IOP  Aftercare:  [facility] CD Outpatient Phase 2     Please note that portions of this note were completed with a voice recognition program. Efforts were made to edit dictation, but occasionally words are mistranscribed.     This document signed by Joe Ruth LCSW, Gloria 15, 2022, 14:24 EDT

## 2022-06-16 ENCOUNTER — APPOINTMENT (OUTPATIENT)
Dept: PSYCHIATRY | Facility: HOSPITAL | Age: 32
End: 2022-06-16

## 2022-06-20 ENCOUNTER — APPOINTMENT (OUTPATIENT)
Dept: PSYCHIATRY | Facility: HOSPITAL | Age: 32
End: 2022-06-20

## 2022-06-21 ENCOUNTER — APPOINTMENT (OUTPATIENT)
Dept: PSYCHIATRY | Facility: HOSPITAL | Age: 32
End: 2022-06-21

## 2022-06-22 ENCOUNTER — APPOINTMENT (OUTPATIENT)
Dept: PSYCHIATRY | Facility: HOSPITAL | Age: 32
End: 2022-06-22

## 2022-06-22 ENCOUNTER — DOCUMENTATION (OUTPATIENT)
Dept: PSYCHIATRY | Facility: CLINIC | Age: 32
End: 2022-06-22

## 2022-06-22 NOTE — PROGRESS NOTES
"  [address] [address]  [phone]      HOSPITAL-BASED PROGRAMS   (IOP/PHP/OUTPATIENT MH and CD)  DISCHARGE SUMMARY      Patient Name: Gisel \"Jessica\" Mena  Patient : 1990  Program: CD IOP  Attending Psychiatric Provider: Dr. David Damian  Therapist: Joe Ruth LCSW    Date of Admission: 2022  Last Date Attended: Gloria 15, 2022  Date of Discharge: 2022  Total Number of Days: 2  Reason for Discharge: Noncompliance with attendance policy   Aftercare Plan (including next appointment date and time, and facility name, if possible): Participant will continue with her case plan as directed by her TAP worker.     Discharge Medications:    Current Outpatient Medications:   •  docusate sodium 100 MG capsule, Take 1 capsule by mouth 2 (Two) Times a Day As Needed for Constipation., Disp: 40 capsule, Rfl: 1  •  ferrous sulfate 325 (65 FE) MG tablet, Take 1 tablet by mouth 2 (Two) Times a Day With Meals., Disp: 60 tablet, Rfl: 1  •  ibuprofen (ADVIL,MOTRIN) 800 MG tablet, Take 1 tablet by mouth Every 8 (Eight) Hours As Needed for Mild Pain ., Disp: 60 tablet, Rfl: 1  •  labetalol (NORMODYNE) 300 MG tablet, Take 1 tablet by mouth Every 12 (Twelve) Hours., Disp: 60 tablet, Rfl: 0    Patient Diagnosis(es):  1. Opioid use disorder, severe, in early remission (HCC)  F11.21 304.03   2. Methamphetamine use disorder, severe, in sustained remission (HCC)  F15.21 304.43   3. PTSD (post-traumatic stress disorder)  F43.10 309.81         This document signed by Joe Ruth LCSW, 2022, 11:52 EDT      "

## 2022-06-23 ENCOUNTER — APPOINTMENT (OUTPATIENT)
Dept: PSYCHIATRY | Facility: HOSPITAL | Age: 32
End: 2022-06-23

## 2022-06-27 ENCOUNTER — APPOINTMENT (OUTPATIENT)
Dept: PSYCHIATRY | Facility: HOSPITAL | Age: 32
End: 2022-06-27

## 2022-06-28 ENCOUNTER — APPOINTMENT (OUTPATIENT)
Dept: PSYCHIATRY | Facility: HOSPITAL | Age: 32
End: 2022-06-28

## 2022-06-29 ENCOUNTER — APPOINTMENT (OUTPATIENT)
Dept: PSYCHIATRY | Facility: HOSPITAL | Age: 32
End: 2022-06-29

## 2022-06-30 ENCOUNTER — APPOINTMENT (OUTPATIENT)
Dept: PSYCHIATRY | Facility: HOSPITAL | Age: 32
End: 2022-06-30

## 2022-07-05 ENCOUNTER — APPOINTMENT (OUTPATIENT)
Dept: PSYCHIATRY | Facility: HOSPITAL | Age: 32
End: 2022-07-05

## 2022-07-06 ENCOUNTER — APPOINTMENT (OUTPATIENT)
Dept: PSYCHIATRY | Facility: HOSPITAL | Age: 32
End: 2022-07-06

## 2023-09-19 PROCEDURE — 93005 ELECTROCARDIOGRAM TRACING: CPT | Performed by: STUDENT IN AN ORGANIZED HEALTH CARE EDUCATION/TRAINING PROGRAM

## 2023-09-19 PROCEDURE — 93010 ELECTROCARDIOGRAM REPORT: CPT | Performed by: INTERNAL MEDICINE

## 2023-09-19 PROCEDURE — 99284 EMERGENCY DEPT VISIT MOD MDM: CPT

## 2023-09-19 RX ORDER — SODIUM CHLORIDE 0.9 % (FLUSH) 0.9 %
10 SYRINGE (ML) INJECTION AS NEEDED
Status: DISCONTINUED | OUTPATIENT
Start: 2023-09-19 | End: 2023-09-20 | Stop reason: HOSPADM

## 2023-09-19 RX ORDER — ASPIRIN 81 MG/1
324 TABLET, CHEWABLE ORAL ONCE
Status: COMPLETED | OUTPATIENT
Start: 2023-09-19 | End: 2023-09-19

## 2023-09-19 RX ADMIN — ASPIRIN 324 MG: 81 TABLET, CHEWABLE ORAL at 23:55

## 2023-09-20 ENCOUNTER — HOSPITAL ENCOUNTER (EMERGENCY)
Facility: HOSPITAL | Age: 33
Discharge: HOME | End: 2023-09-20
Attending: STUDENT IN AN ORGANIZED HEALTH CARE EDUCATION/TRAINING PROGRAM
Payer: COMMERCIAL

## 2023-09-20 ENCOUNTER — APPOINTMENT (OUTPATIENT)
Dept: GENERAL RADIOLOGY | Facility: HOSPITAL | Age: 33
End: 2023-09-20
Payer: COMMERCIAL

## 2023-09-20 VITALS
HEART RATE: 100 BPM | BODY MASS INDEX: 27.28 KG/M2 | HEIGHT: 68 IN | OXYGEN SATURATION: 100 % | TEMPERATURE: 97.7 F | WEIGHT: 180 LBS | SYSTOLIC BLOOD PRESSURE: 120 MMHG | DIASTOLIC BLOOD PRESSURE: 75 MMHG | RESPIRATION RATE: 20 BRPM

## 2023-09-20 DIAGNOSIS — R07.9 CHEST PAIN, UNSPECIFIED TYPE: Primary | ICD-10-CM

## 2023-09-20 LAB
ALBUMIN SERPL-MCNC: 4.4 G/DL (ref 3.5–5.2)
ALBUMIN/GLOB SERPL: 1.2 G/DL
ALP SERPL-CCNC: 81 U/L (ref 39–117)
ALT SERPL W P-5'-P-CCNC: <5 U/L (ref 1–33)
ANION GAP SERPL CALCULATED.3IONS-SCNC: 9.4 MMOL/L (ref 5–15)
ANISOCYTOSIS BLD QL: NORMAL
AST SERPL-CCNC: 19 U/L (ref 1–32)
B-HCG UR QL: NEGATIVE
BASOPHILS # BLD AUTO: 0.07 10*3/MM3 (ref 0–0.2)
BASOPHILS NFR BLD AUTO: 0.7 % (ref 0–1.5)
BILIRUB SERPL-MCNC: 0.5 MG/DL (ref 0–1.2)
BUN SERPL-MCNC: 8 MG/DL (ref 6–20)
BUN/CREAT SERPL: 9.5 (ref 7–25)
CALCIUM SPEC-SCNC: 9.1 MG/DL (ref 8.6–10.5)
CHLORIDE SERPL-SCNC: 98 MMOL/L (ref 98–107)
CO2 SERPL-SCNC: 24.6 MMOL/L (ref 22–29)
CREAT SERPL-MCNC: 0.84 MG/DL (ref 0.57–1)
DEPRECATED RDW RBC AUTO: 45.3 FL (ref 37–54)
EGFRCR SERPLBLD CKD-EPI 2021: 94.2 ML/MIN/1.73
EOSINOPHIL # BLD AUTO: 0 10*3/MM3 (ref 0–0.4)
EOSINOPHIL NFR BLD AUTO: 0 % (ref 0.3–6.2)
ERYTHROCYTE [DISTWIDTH] IN BLOOD BY AUTOMATED COUNT: 17.6 % (ref 12.3–15.4)
GEN 5 2HR TROPONIN T REFLEX: <6 NG/L
GLOBULIN UR ELPH-MCNC: 3.7 GM/DL
GLUCOSE SERPL-MCNC: 106 MG/DL (ref 65–99)
HCT VFR BLD AUTO: 32.9 % (ref 34–46.6)
HGB BLD-MCNC: 9.5 G/DL (ref 12–15.9)
HOLD SPECIMEN: NORMAL
HOLD SPECIMEN: NORMAL
HYPOCHROMIA BLD QL: NORMAL
IMM GRANULOCYTES # BLD AUTO: 0.02 10*3/MM3 (ref 0–0.05)
IMM GRANULOCYTES NFR BLD AUTO: 0.2 % (ref 0–0.5)
LYMPHOCYTES # BLD AUTO: 2.8 10*3/MM3 (ref 0.7–3.1)
LYMPHOCYTES NFR BLD AUTO: 28.7 % (ref 19.6–45.3)
MCH RBC QN AUTO: 20.8 PG (ref 26.6–33)
MCHC RBC AUTO-ENTMCNC: 28.9 G/DL (ref 31.5–35.7)
MCV RBC AUTO: 72 FL (ref 79–97)
MICROCYTES BLD QL: NORMAL
MONOCYTES # BLD AUTO: 0.67 10*3/MM3 (ref 0.1–0.9)
MONOCYTES NFR BLD AUTO: 6.9 % (ref 5–12)
NEUTROPHILS NFR BLD AUTO: 6.21 10*3/MM3 (ref 1.7–7)
NEUTROPHILS NFR BLD AUTO: 63.5 % (ref 42.7–76)
NRBC BLD AUTO-RTO: 0 /100 WBC (ref 0–0.2)
NT-PROBNP SERPL-MCNC: <36 PG/ML (ref 0–450)
PLAT MORPH BLD: NORMAL
PLATELET # BLD AUTO: 399 10*3/MM3 (ref 140–450)
PMV BLD AUTO: 9.7 FL (ref 6–12)
POTASSIUM SERPL-SCNC: 3.3 MMOL/L (ref 3.5–5.2)
PROT SERPL-MCNC: 8.1 G/DL (ref 6–8.5)
RBC # BLD AUTO: 4.57 10*6/MM3 (ref 3.77–5.28)
SODIUM SERPL-SCNC: 132 MMOL/L (ref 136–145)
TROPONIN T DELTA: NORMAL
TROPONIN T SERPL HS-MCNC: <6 NG/L
WBC NRBC COR # BLD: 9.77 10*3/MM3 (ref 3.4–10.8)
WHOLE BLOOD HOLD COAG: NORMAL
WHOLE BLOOD HOLD SPECIMEN: NORMAL

## 2023-09-20 PROCEDURE — 71045 X-RAY EXAM CHEST 1 VIEW: CPT | Performed by: RADIOLOGY

## 2023-09-20 PROCEDURE — 84484 ASSAY OF TROPONIN QUANT: CPT | Performed by: PHYSICIAN ASSISTANT

## 2023-09-20 PROCEDURE — 96374 THER/PROPH/DIAG INJ IV PUSH: CPT

## 2023-09-20 PROCEDURE — 25010000002 DIPHENHYDRAMINE PER 50 MG: Performed by: STUDENT IN AN ORGANIZED HEALTH CARE EDUCATION/TRAINING PROGRAM

## 2023-09-20 PROCEDURE — 85007 BL SMEAR W/DIFF WBC COUNT: CPT | Performed by: PHYSICIAN ASSISTANT

## 2023-09-20 PROCEDURE — 96375 TX/PRO/DX INJ NEW DRUG ADDON: CPT

## 2023-09-20 PROCEDURE — 36415 COLL VENOUS BLD VENIPUNCTURE: CPT

## 2023-09-20 PROCEDURE — 25010000002 DEXAMETHASONE SODIUM PHOSPHATE 10 MG/ML SOLUTION: Performed by: STUDENT IN AN ORGANIZED HEALTH CARE EDUCATION/TRAINING PROGRAM

## 2023-09-20 PROCEDURE — 81025 URINE PREGNANCY TEST: CPT | Performed by: PHYSICIAN ASSISTANT

## 2023-09-20 PROCEDURE — 94799 UNLISTED PULMONARY SVC/PX: CPT

## 2023-09-20 PROCEDURE — 83880 ASSAY OF NATRIURETIC PEPTIDE: CPT | Performed by: STUDENT IN AN ORGANIZED HEALTH CARE EDUCATION/TRAINING PROGRAM

## 2023-09-20 PROCEDURE — 80053 COMPREHEN METABOLIC PANEL: CPT | Performed by: PHYSICIAN ASSISTANT

## 2023-09-20 PROCEDURE — 93005 ELECTROCARDIOGRAM TRACING: CPT | Performed by: STUDENT IN AN ORGANIZED HEALTH CARE EDUCATION/TRAINING PROGRAM

## 2023-09-20 PROCEDURE — 71045 X-RAY EXAM CHEST 1 VIEW: CPT

## 2023-09-20 PROCEDURE — 94664 DEMO&/EVAL PT USE INHALER: CPT

## 2023-09-20 PROCEDURE — 94640 AIRWAY INHALATION TREATMENT: CPT

## 2023-09-20 PROCEDURE — 85025 COMPLETE CBC W/AUTO DIFF WBC: CPT | Performed by: PHYSICIAN ASSISTANT

## 2023-09-20 PROCEDURE — 93010 ELECTROCARDIOGRAM REPORT: CPT | Performed by: INTERNAL MEDICINE

## 2023-09-20 RX ORDER — IPRATROPIUM BROMIDE AND ALBUTEROL SULFATE 2.5; .5 MG/3ML; MG/3ML
3 SOLUTION RESPIRATORY (INHALATION) ONCE
Status: COMPLETED | OUTPATIENT
Start: 2023-09-20 | End: 2023-09-20

## 2023-09-20 RX ORDER — DEXAMETHASONE SODIUM PHOSPHATE 10 MG/ML
5 INJECTION, SOLUTION INTRAMUSCULAR; INTRAVENOUS ONCE
Status: COMPLETED | OUTPATIENT
Start: 2023-09-20 | End: 2023-09-20

## 2023-09-20 RX ORDER — DIPHENHYDRAMINE HYDROCHLORIDE 50 MG/ML
25 INJECTION INTRAMUSCULAR; INTRAVENOUS ONCE
Status: COMPLETED | OUTPATIENT
Start: 2023-09-20 | End: 2023-09-20

## 2023-09-20 RX ADMIN — DIPHENHYDRAMINE HYDROCHLORIDE 25 MG: 50 INJECTION INTRAMUSCULAR; INTRAVENOUS at 03:40

## 2023-09-20 RX ADMIN — IPRATROPIUM BROMIDE AND ALBUTEROL SULFATE 3 ML: .5; 2.5 SOLUTION RESPIRATORY (INHALATION) at 02:08

## 2023-09-20 RX ADMIN — DEXAMETHASONE SODIUM PHOSPHATE 5 MG: 10 INJECTION INTRAMUSCULAR; INTRAVENOUS at 03:11

## 2023-09-20 NOTE — ED NOTES
Attempted to call Pt back for labwork; Pt could not be found in lobby; will reattempt in 10 minutes.

## 2023-09-20 NOTE — ED PROVIDER NOTES
Subjective   History of Present Illness  33-year-old female with past medical history of substance abuse and lupus presents to the ER due to concerns for elevated blood pressure readings and chest pain.  Patient denies recent injury or trauma.  No cough or congestion.  Patient notes symptoms been present for the last 1 to 2 days.  No obvious aggravating or alleviating factors.  No nausea or vomiting.  No diaphoresis.  Vital stable.  Afebrile    Review of Systems   Cardiovascular:  Positive for chest pain.   All other systems reviewed and are negative.    Past Medical History:   Diagnosis Date    Lupus     Substance abuse     Urinary tract infection        No Known Allergies    Past Surgical History:   Procedure Laterality Date    ADENOIDECTOMY      APPENDECTOMY      CHOLECYSTECTOMY      TONSILLECTOMY         Family History   Problem Relation Age of Onset    Diabetes Mother     Congenital heart disease Mother        Social History     Socioeconomic History    Marital status:    Tobacco Use    Smoking status: Every Day     Packs/day: 3.00     Types: Cigarettes    Smokeless tobacco: Never   Vaping Use    Vaping Use: Some days   Substance and Sexual Activity    Alcohol use: No    Drug use: No    Sexual activity: Not Currently     Partners: Male           Objective   Physical Exam  Constitutional:       General: She is not in acute distress.     Appearance: Normal appearance. She is not ill-appearing.   HENT:      Head: Normocephalic and atraumatic.      Right Ear: External ear normal.      Left Ear: External ear normal.      Nose: Nose normal.      Mouth/Throat:      Mouth: Mucous membranes are moist.   Eyes:      Extraocular Movements: Extraocular movements intact.      Pupils: Pupils are equal, round, and reactive to light.   Cardiovascular:      Rate and Rhythm: Normal rate and regular rhythm.      Heart sounds: No murmur heard.  Pulmonary:      Effort: Pulmonary effort is normal. No respiratory distress.       Breath sounds: Normal breath sounds. No wheezing.   Abdominal:      General: Bowel sounds are normal.      Palpations: Abdomen is soft.      Tenderness: There is no abdominal tenderness.   Musculoskeletal:         General: No deformity or signs of injury. Normal range of motion.      Cervical back: Normal range of motion and neck supple.   Skin:     General: Skin is warm and dry.      Findings: No erythema.   Neurological:      General: No focal deficit present.      Mental Status: She is alert and oriented to person, place, and time. Mental status is at baseline.      Cranial Nerves: No cranial nerve deficit.   Psychiatric:         Mood and Affect: Mood normal.         Behavior: Behavior normal.         Thought Content: Thought content normal.       Procedures           ED Course  ED Course as of 09/20/23 0405   Tue Sep 19, 2023   2230 EKG notes sinus rhythm.  98 bpm.  I directly evaluated the EKG of this patient and noted no acute abnormalities.  Electronically signed by Michele Boucher DO, 09/19/23, 10:30 PM EDT.   [SF]   Wed Sep 20, 2023   0339 EKG noted sinus rhythm.  Incomplete right bundle branch block noted.  93 bpm.  No acute ST elevation.  Electronically signed by Michele Boucher DO, 09/20/23, 3:39 AM EDT.   [SF]      ED Course User Index  [SF] Michele Boucher DO      XR Chest 1 View    Result Date: 9/20/2023  No acute cardiopulmonary process.   This report was finalized on 9/20/2023 2:26 AM by Alex Pallas, DO.       Results for orders placed or performed during the hospital encounter of 09/20/23   Comprehensive Metabolic Panel    Specimen: Arm, Left; Blood   Result Value Ref Range    Glucose 106 (H) 65 - 99 mg/dL    BUN 8 6 - 20 mg/dL    Creatinine 0.84 0.57 - 1.00 mg/dL    Sodium 132 (L) 136 - 145 mmol/L    Potassium 3.3 (L) 3.5 - 5.2 mmol/L    Chloride 98 98 - 107 mmol/L    CO2 24.6 22.0 - 29.0 mmol/L    Calcium 9.1 8.6 - 10.5 mg/dL    Total Protein 8.1 6.0 - 8.5 g/dL    Albumin 4.4 3.5 - 5.2 g/dL     ALT (SGPT) <5 1 - 33 U/L    AST (SGOT) 19 1 - 32 U/L    Alkaline Phosphatase 81 39 - 117 U/L    Total Bilirubin 0.5 0.0 - 1.2 mg/dL    Globulin 3.7 gm/dL    A/G Ratio 1.2 g/dL    BUN/Creatinine Ratio 9.5 7.0 - 25.0    Anion Gap 9.4 5.0 - 15.0 mmol/L    eGFR 94.2 >60.0 mL/min/1.73   High Sensitivity Troponin T    Specimen: Arm, Left; Blood   Result Value Ref Range    HS Troponin T <6 <10 ng/L   Pregnancy, Urine - Urine, Clean Catch    Specimen: Urine, Clean Catch   Result Value Ref Range    HCG, Urine QL Negative Negative   CBC Auto Differential    Specimen: Arm, Left; Blood   Result Value Ref Range    WBC 9.77 3.40 - 10.80 10*3/mm3    RBC 4.57 3.77 - 5.28 10*6/mm3    Hemoglobin 9.5 (L) 12.0 - 15.9 g/dL    Hematocrit 32.9 (L) 34.0 - 46.6 %    MCV 72.0 (L) 79.0 - 97.0 fL    MCH 20.8 (L) 26.6 - 33.0 pg    MCHC 28.9 (L) 31.5 - 35.7 g/dL    RDW 17.6 (H) 12.3 - 15.4 %    RDW-SD 45.3 37.0 - 54.0 fl    MPV 9.7 6.0 - 12.0 fL    Platelets 399 140 - 450 10*3/mm3    Neutrophil % 63.5 42.7 - 76.0 %    Lymphocyte % 28.7 19.6 - 45.3 %    Monocyte % 6.9 5.0 - 12.0 %    Eosinophil % 0.0 (L) 0.3 - 6.2 %    Basophil % 0.7 0.0 - 1.5 %    Immature Grans % 0.2 0.0 - 0.5 %    Neutrophils, Absolute 6.21 1.70 - 7.00 10*3/mm3    Lymphocytes, Absolute 2.80 0.70 - 3.10 10*3/mm3    Monocytes, Absolute 0.67 0.10 - 0.90 10*3/mm3    Eosinophils, Absolute 0.00 0.00 - 0.40 10*3/mm3    Basophils, Absolute 0.07 0.00 - 0.20 10*3/mm3    Immature Grans, Absolute 0.02 0.00 - 0.05 10*3/mm3    nRBC 0.0 0.0 - 0.2 /100 WBC   Scan Slide    Specimen: Arm, Left; Blood   Result Value Ref Range    Anisocytosis Slight/1+ None Seen    Hypochromia Mod/2+ None Seen    Microcytes Slight/1+ None Seen    Platelet Morphology Normal Normal   High Sensitivity Troponin T 2Hr    Specimen: Hand, Right; Blood   Result Value Ref Range    HS Troponin T <6 <10 ng/L    Troponin T Delta     BNP    Specimen: Arm, Left; Blood   Result Value Ref Range    proBNP <36.0 0.0 - 450.0 pg/mL    ECG 12 Lead Chest Pain   Result Value Ref Range    QT Interval 354 ms    QTC Interval 451 ms   ECG 12 Lead Chest Pain   Result Value Ref Range    QT Interval 368 ms    QTC Interval 457 ms   Green Top (Gel)   Result Value Ref Range    Extra Tube Hold for add-ons.    Lavender Top   Result Value Ref Range    Extra Tube hold for add-on    Gold Top - SST   Result Value Ref Range    Extra Tube Hold for add-ons.    Light Blue Top   Result Value Ref Range    Extra Tube Hold for add-ons.                      HEART Score: 0                      Medical Decision Making  CBC and CMP unremarkable.  Troponin trended x2 and within normal limits.  EKG unremarkable.  Chest x-ray unremarkable.  Heart score and high sensitive troponins places patient at low risk for ACS at this point time.  Anxiety likely a contributing factor.  Work up and results were discussed throughly with the patient.  The patient will be discharged for further monitoring and management with their PCP.  Red flags, warning signs, worsening symptoms, and when to return to the ER discussed with and understood by the patient.  Patient will follow up with their PCP in a timely manner.  Vitals stable at discharge.    Amount and/or Complexity of Data Reviewed  Labs: ordered. Decision-making details documented in ED Course.  Radiology: ordered. Decision-making details documented in ED Course.  ECG/medicine tests: ordered.    Risk  OTC drugs.  Prescription drug management.        Final diagnoses:   Chest pain, unspecified type       ED Disposition  ED Disposition       ED Disposition   Discharge    Condition   Stable    Comment   --               Juwan Paredes MD  [address] [place] [id number]  [phone]    In 1 week      [facility] EMERGENCY DEPARTMENT  [address] [place] [id number] [phone]    If symptoms worsen         Medication List      No changes were made to your prescriptions during this visit.             Michele Boucher,   09/20/23 0405

## 2023-09-20 NOTE — ED NOTES
MEDICAL SCREENING:    Reason for Visit: hypertension / chest pain x one day    Patient initially seen in triage.  The patient was advised further evaluation and diagnostic testing will be needed, some of the treatment and testing will be initiated in the lobby in order to begin the process.  The patient will be returned to the waiting area for the time being and possibly be re-assessed by a subsequent ED provider.  The patient will be brought back to the treatment area in as timely manner as possible.       Victoriano Honeycutt II, PA  09/19/23 2180

## 2023-09-21 LAB
QT INTERVAL: 354 MS
QT INTERVAL: 368 MS
QTC INTERVAL: 451 MS
QTC INTERVAL: 457 MS

## 2023-12-14 ENCOUNTER — HOSPITAL ENCOUNTER (OUTPATIENT)
Facility: HOSPITAL | Age: 33
Setting detail: OBSERVATION
Discharge: HOME | End: 2023-12-16
Attending: EMERGENCY MEDICINE | Admitting: OBSTETRICS & GYNECOLOGY
Payer: COMMERCIAL

## 2023-12-14 ENCOUNTER — APPOINTMENT (OUTPATIENT)
Dept: ULTRASOUND IMAGING | Facility: HOSPITAL | Age: 33
End: 2023-12-14
Payer: COMMERCIAL

## 2023-12-14 DIAGNOSIS — N93.9 VAGINAL BLEEDING, ABNORMAL: Primary | ICD-10-CM

## 2023-12-14 DIAGNOSIS — O03.4 INCOMPLETE ABORTION: ICD-10-CM

## 2023-12-14 DIAGNOSIS — O03.9 MISCARRIAGE: ICD-10-CM

## 2023-12-14 LAB
ABO GROUP BLD: NORMAL
ALBUMIN SERPL-MCNC: 4 G/DL (ref 3.5–5.2)
ALBUMIN/GLOB SERPL: 1.3 G/DL
ALP SERPL-CCNC: 85 U/L (ref 39–117)
ALT SERPL W P-5'-P-CCNC: 18 U/L (ref 1–33)
ANION GAP SERPL CALCULATED.3IONS-SCNC: 8.2 MMOL/L (ref 5–15)
ANISOCYTOSIS BLD QL: NORMAL
ANTI-K: NORMAL
APTT PPP: 26.7 SECONDS (ref 26.5–34.5)
AST SERPL-CCNC: 25 U/L (ref 1–32)
BASOPHILS # BLD AUTO: 0.06 10*3/MM3 (ref 0–0.2)
BASOPHILS NFR BLD AUTO: 0.8 % (ref 0–1.5)
BILIRUB SERPL-MCNC: 0.2 MG/DL (ref 0–1.2)
BLD GP AB SCN SERPL QL: POSITIVE
BUN SERPL-MCNC: 9 MG/DL (ref 6–20)
BUN/CREAT SERPL: 10.3 (ref 7–25)
CALCIUM SPEC-SCNC: 9 MG/DL (ref 8.6–10.5)
CHLORIDE SERPL-SCNC: 104 MMOL/L (ref 98–107)
CO2 SERPL-SCNC: 26.8 MMOL/L (ref 22–29)
CREAT SERPL-MCNC: 0.87 MG/DL (ref 0.57–1)
DEPRECATED RDW RBC AUTO: 43.4 FL (ref 37–54)
EGFRCR SERPLBLD CKD-EPI 2021: 90.3 ML/MIN/1.73
EOSINOPHIL # BLD AUTO: 0 10*3/MM3 (ref 0–0.4)
EOSINOPHIL NFR BLD AUTO: 0 % (ref 0.3–6.2)
ERYTHROCYTE [DISTWIDTH] IN BLOOD BY AUTOMATED COUNT: 17 % (ref 12.3–15.4)
GLOBULIN UR ELPH-MCNC: 3.2 GM/DL
GLUCOSE BLDC GLUCOMTR-MCNC: 100 MG/DL (ref 70–130)
GLUCOSE SERPL-MCNC: 111 MG/DL (ref 65–99)
HCG INTACT+B SERPL-ACNC: 84.2 MIU/ML
HCG SERPL QL: POSITIVE
HCT VFR BLD AUTO: 23.5 % (ref 34–46.6)
HGB BLD-MCNC: 6.5 G/DL (ref 12–15.9)
HOLD SPECIMEN: NORMAL
HYPOCHROMIA BLD QL: NORMAL
IMM GRANULOCYTES # BLD AUTO: 0.03 10*3/MM3 (ref 0–0.05)
IMM GRANULOCYTES NFR BLD AUTO: 0.4 % (ref 0–0.5)
INR PPP: 1.08 (ref 0.9–1.1)
LYMPHOCYTES # BLD AUTO: 2.1 10*3/MM3 (ref 0.7–3.1)
LYMPHOCYTES NFR BLD AUTO: 26.4 % (ref 19.6–45.3)
MCH RBC QN AUTO: 19.6 PG (ref 26.6–33)
MCHC RBC AUTO-ENTMCNC: 27.7 G/DL (ref 31.5–35.7)
MCV RBC AUTO: 70.8 FL (ref 79–97)
MICROCYTES BLD QL: NORMAL
MONOCYTES # BLD AUTO: 0.6 10*3/MM3 (ref 0.1–0.9)
MONOCYTES NFR BLD AUTO: 7.5 % (ref 5–12)
NEUTROPHILS NFR BLD AUTO: 5.16 10*3/MM3 (ref 1.7–7)
NEUTROPHILS NFR BLD AUTO: 64.9 % (ref 42.7–76)
NRBC BLD AUTO-RTO: 0 /100 WBC (ref 0–0.2)
OVALOCYTES BLD QL SMEAR: NORMAL
PLAT MORPH BLD: NORMAL
PLATELET # BLD AUTO: 392 10*3/MM3 (ref 140–450)
PMV BLD AUTO: 10.2 FL (ref 6–12)
POTASSIUM SERPL-SCNC: 4 MMOL/L (ref 3.5–5.2)
PROT SERPL-MCNC: 7.2 G/DL (ref 6–8.5)
PROTHROMBIN TIME: 14.5 SECONDS (ref 12.1–14.7)
RBC # BLD AUTO: 3.32 10*6/MM3 (ref 3.77–5.28)
RH BLD: POSITIVE
SODIUM SERPL-SCNC: 139 MMOL/L (ref 136–145)
T&S EXPIRATION DATE: NORMAL
WBC NRBC COR # BLD AUTO: 7.95 10*3/MM3 (ref 3.4–10.8)
WHOLE BLOOD HOLD COAG: NORMAL
WHOLE BLOOD HOLD SPECIMEN: NORMAL

## 2023-12-14 PROCEDURE — 86902 BLOOD TYPE ANTIGEN DONOR EA: CPT

## 2023-12-14 PROCEDURE — 86920 COMPATIBILITY TEST SPIN: CPT

## 2023-12-14 PROCEDURE — 86922 COMPATIBILITY TEST ANTIGLOB: CPT

## 2023-12-14 PROCEDURE — 36415 COLL VENOUS BLD VENIPUNCTURE: CPT

## 2023-12-14 PROCEDURE — 86850 RBC ANTIBODY SCREEN: CPT

## 2023-12-14 PROCEDURE — 99284 EMERGENCY DEPT VISIT MOD MDM: CPT

## 2023-12-14 PROCEDURE — 96375 TX/PRO/DX INJ NEW DRUG ADDON: CPT

## 2023-12-14 PROCEDURE — 85730 THROMBOPLASTIN TIME PARTIAL: CPT | Performed by: EMERGENCY MEDICINE

## 2023-12-14 PROCEDURE — 25010000002 ONDANSETRON PER 1 MG: Performed by: EMERGENCY MEDICINE

## 2023-12-14 PROCEDURE — 36430 TRANSFUSION BLD/BLD COMPNT: CPT

## 2023-12-14 PROCEDURE — 86901 BLOOD TYPING SEROLOGIC RH(D): CPT

## 2023-12-14 PROCEDURE — 85025 COMPLETE CBC W/AUTO DIFF WBC: CPT

## 2023-12-14 PROCEDURE — 86900 BLOOD TYPING SEROLOGIC ABO: CPT

## 2023-12-14 PROCEDURE — 76801 OB US < 14 WKS SINGLE FETUS: CPT

## 2023-12-14 PROCEDURE — 84702 CHORIONIC GONADOTROPIN TEST: CPT | Performed by: EMERGENCY MEDICINE

## 2023-12-14 PROCEDURE — 82948 REAGENT STRIP/BLOOD GLUCOSE: CPT

## 2023-12-14 PROCEDURE — 85007 BL SMEAR W/DIFF WBC COUNT: CPT

## 2023-12-14 PROCEDURE — 25810000003 SODIUM CHLORIDE 0.9 % SOLUTION: Performed by: EMERGENCY MEDICINE

## 2023-12-14 PROCEDURE — 84703 CHORIONIC GONADOTROPIN ASSAY: CPT

## 2023-12-14 PROCEDURE — 85610 PROTHROMBIN TIME: CPT | Performed by: EMERGENCY MEDICINE

## 2023-12-14 PROCEDURE — 86870 RBC ANTIBODY IDENTIFICATION: CPT

## 2023-12-14 PROCEDURE — 96374 THER/PROPH/DIAG INJ IV PUSH: CPT

## 2023-12-14 PROCEDURE — P9016 RBC LEUKOCYTES REDUCED: HCPCS

## 2023-12-14 PROCEDURE — 80053 COMPREHEN METABOLIC PANEL: CPT

## 2023-12-14 RX ORDER — SODIUM CHLORIDE 0.9 % (FLUSH) 0.9 %
10 SYRINGE (ML) INJECTION AS NEEDED
Status: DISCONTINUED | OUTPATIENT
Start: 2023-12-14 | End: 2023-12-15

## 2023-12-14 RX ORDER — ONDANSETRON 2 MG/ML
4 INJECTION INTRAMUSCULAR; INTRAVENOUS ONCE
Status: COMPLETED | OUTPATIENT
Start: 2023-12-14 | End: 2023-12-14

## 2023-12-14 RX ADMIN — SODIUM CHLORIDE 500 ML: 9 INJECTION, SOLUTION INTRAVENOUS at 21:53

## 2023-12-14 RX ADMIN — ONDANSETRON 4 MG: 2 INJECTION INTRAMUSCULAR; INTRAVENOUS at 21:57

## 2023-12-14 NOTE — ED PROVIDER NOTES
Subjective   History of Present Illness  HPI:   33-year-old female was in usual state of health.  This afternoon she started having some light abdominal cramping followed by bright red vaginal bleeding.  It continued with large amounts at the house which prompted call of EMS.  Patient is never experienced this before.  Last menstrual period was roughly 2 months ago.  Patient is sexually active.    Patient is      Review of systems:  Negative fever and chills  Negative new foods  Negative nausea vomiting  Negative melena or hematochezia  Negative diarrhea or constipation  Negative chest pain or palpitations  Negative shortness of breath or cough  Negative ill contact or travel  Negative swelling  Negative rash  Negative headaches or vision changes  Negative dysuria or hematuria  All other systems reviewed are negative      Past medical history -reviewed nursing notes: Lupus  Past surgical history -reviewed nursing notes:    Social history: -Reviewed nursing notes:  Medications and allergies -reviewed nursing notes:    Physical examination:  Vital signs: Reviewed  General: Nontoxic, nondistressed, AOx 4 pale in appearance  HEENT: Oral mucosa moist, pupils equal and reactive to light,, sclera is clear  Neck: Unremarkable  Lungs: Nonlabored breathing, equal rise of the chest, bilateral breath sounds are clear, negative cough wheezing crackles  Cardiovascular: Regular rate and rhythm, negative rubs or murmurs  Abdomen: Soft, nontender to palpation, negative guarding rebound or distention, positive bowel sounds negative Seay sign  Legs: [Negative for edema]  Skin: [Negative petechiae,] [ negative rash]  Neuro: No gross motor or sensory deficit, speech is clear, movements are fluid without ataxia  Mood/affect: Unremarkable    Pelvic exam: With GEOFFREY Hunter  External exam: Unremarkable  Vaginal vault: Blood clots noted  Cervical os :  partially open no without foreign body      Medical Decision Making After reviewing  HPI, review of systems available studies and history, producing an:  Type and screen  IVF  Transfuse 2 unit packed RBCs    Assessment and plan:   This patient is pale however nontoxic on examination currently her vital signs stable with active vaginal bleeding.  No retained products in the cervical os.  Patient does have a positive hCG.  Will go ahead and transfuse 2 units.  Patient is having no specific pain to imply an ectopic yet will likely proceed with ultrasound,    I discussed the case with gynecology Dr. Moody,  he request ultrasound.    I discussed the case again with the gynecologist, Dr. Moody will admit the patient for further care, I greatly appreciate his assistance    Discussed with  laboratory, radiology studies, diagnosis and action plan, and follow-up.  Patient verbalized understanding and agreement    Clinical impression:  Miscarriage in first trimester pregnancy  Vaginal bleeding: Acute secondary to above  Normocytic anemia: Secondary to hemorrhaging  Critical care time 45 minutes excluding other billable procedures  Disposition: Admit  Condition: Stable     This note was performed, in part, by voice-recognition software and may contain dictated related graphical errors including word substitution errors may exist.  These areas have been corrected to the best of my ability, however some errors may remain due to prior physician of patient care.    Under the 21st Century Cures Act, medical progress note should be visible to the patient.  It must be taken into consideration that these notes will include professional medical terminology, protocols and practices that may be somewhat confusing without interpretation from your medical team.  It is strongly advisable to review this documentation with your primary care provider.  The intent of this progress note and other documents are to communicate information between medical professionals involved in your care and to serve as future reference for  physicians or other professionals when reviewing your case        Review of Systems    Past Medical History:   Diagnosis Date    Lupus     Substance abuse     Urinary tract infection        No Known Allergies    Past Surgical History:   Procedure Laterality Date    ADENOIDECTOMY      APPENDECTOMY      CHOLECYSTECTOMY      TONSILLECTOMY         Family History   Problem Relation Age of Onset    Diabetes Mother     Congenital heart disease Mother        Social History     Socioeconomic History    Marital status:    Tobacco Use    Smoking status: Every Day     Packs/day: 3     Types: Cigarettes    Smokeless tobacco: Never   Vaping Use    Vaping Use: Some days   Substance and Sexual Activity    Alcohol use: No    Drug use: No    Sexual activity: Not Currently     Partners: Male           Objective   Physical Exam    Procedures           ED Course                                             Medical Decision Making  Problems Addressed:  Vaginal bleeding, abnormal: complicated acute illness or injury    Amount and/or Complexity of Data Reviewed  Labs: ordered.  Radiology: ordered.    Risk  Decision regarding hospitalization.        Final diagnoses:   Vaginal bleeding, abnormal       ED Disposition  ED Disposition       ED Disposition   Decision to Admit    Condition   --    Comment   --               No follow-up provider specified.       Medication List      No changes were made to your prescriptions during this visit.            Iglesia Blancas MD  12/15/23 0202

## 2023-12-15 ENCOUNTER — ANESTHESIA EVENT (OUTPATIENT)
Dept: PERIOP | Facility: HOSPITAL | Age: 33
End: 2023-12-15
Payer: COMMERCIAL

## 2023-12-15 ENCOUNTER — ANESTHESIA (OUTPATIENT)
Dept: PERIOP | Facility: HOSPITAL | Age: 33
End: 2023-12-15
Payer: COMMERCIAL

## 2023-12-15 PROBLEM — O03.4 INCOMPLETE ABORTION: Status: ACTIVE | Noted: 2023-12-15

## 2023-12-15 PROBLEM — D62 ANEMIA DUE TO ACUTE BLOOD LOSS: Status: ACTIVE | Noted: 2023-12-15

## 2023-12-15 PROBLEM — O03.9 MISCARRIAGE: Status: ACTIVE | Noted: 2023-12-15

## 2023-12-15 LAB
AMPHET+METHAMPHET UR QL: POSITIVE
AMPHETAMINES UR QL: POSITIVE
ANISOCYTOSIS BLD QL: ABNORMAL
BARBITURATES UR QL SCN: NEGATIVE
BENZODIAZ UR QL SCN: NEGATIVE
BH BB BLOOD EXPIRATION DATE: NORMAL
BH BB BLOOD EXPIRATION DATE: NORMAL
BH BB BLOOD TYPE BARCODE: 8400
BH BB BLOOD TYPE BARCODE: 8400
BH BB DISPENSE STATUS: NORMAL
BH BB DISPENSE STATUS: NORMAL
BH BB PRODUCT CODE: NORMAL
BH BB PRODUCT CODE: NORMAL
BH BB UNIT NUMBER: NORMAL
BH BB UNIT NUMBER: NORMAL
BUPRENORPHINE SERPL-MCNC: NEGATIVE NG/ML
CANNABINOIDS SERPL QL: NEGATIVE
COCAINE UR QL: NEGATIVE
CROSSMATCH INTERPRETATION: NORMAL
CROSSMATCH INTERPRETATION: NORMAL
DEPRECATED RDW RBC AUTO: 47.1 FL (ref 37–54)
ERYTHROCYTE [DISTWIDTH] IN BLOOD BY AUTOMATED COUNT: 17.9 % (ref 12.3–15.4)
FENTANYL UR-MCNC: NEGATIVE NG/ML
HCT VFR BLD AUTO: 24.8 % (ref 34–46.6)
HCT VFR BLD AUTO: 27.1 % (ref 34–46.6)
HGB BLD-MCNC: 7.3 G/DL (ref 12–15.9)
HGB BLD-MCNC: 8.8 G/DL (ref 12–15.9)
HYPOCHROMIA BLD QL: ABNORMAL
LYMPHOCYTES # BLD MANUAL: 1.62 10*3/MM3 (ref 0.7–3.1)
LYMPHOCYTES NFR BLD MANUAL: 9 % (ref 5–12)
MCH RBC QN AUTO: 21.3 PG (ref 26.6–33)
MCHC RBC AUTO-ENTMCNC: 29.4 G/DL (ref 31.5–35.7)
MCV RBC AUTO: 72.5 FL (ref 79–97)
METHADONE UR QL SCN: NEGATIVE
MICROCYTES BLD QL: ABNORMAL
MONOCYTES # BLD: 0.77 10*3/MM3 (ref 0.1–0.9)
NEUTROPHILS # BLD AUTO: 6.15 10*3/MM3 (ref 1.7–7)
NEUTROPHILS NFR BLD MANUAL: 72 % (ref 42.7–76)
OPIATES UR QL: NEGATIVE
OXYCODONE UR QL SCN: NEGATIVE
PCP UR QL SCN: NEGATIVE
PLAT MORPH BLD: NORMAL
PLATELET # BLD AUTO: 292 10*3/MM3 (ref 140–450)
PMV BLD AUTO: 9.7 FL (ref 6–12)
POIKILOCYTOSIS BLD QL SMEAR: ABNORMAL
RBC # BLD AUTO: 3.42 10*6/MM3 (ref 3.77–5.28)
TRICYCLICS UR QL SCN: NEGATIVE
UNIT  ABO: NORMAL
UNIT  ABO: NORMAL
UNIT  RH: NORMAL
UNIT  RH: NORMAL
VARIANT LYMPHS NFR BLD MANUAL: 19 % (ref 19.6–45.3)
WBC NRBC COR # BLD AUTO: 8.54 10*3/MM3 (ref 3.4–10.8)

## 2023-12-15 PROCEDURE — P9016 RBC LEUKOCYTES REDUCED: HCPCS

## 2023-12-15 PROCEDURE — 36430 TRANSFUSION BLD/BLD COMPNT: CPT

## 2023-12-15 PROCEDURE — 25010000002 FENTANYL CITRATE (PF) 50 MCG/ML SOLUTION: Performed by: NURSE ANESTHETIST, CERTIFIED REGISTERED

## 2023-12-15 PROCEDURE — G0378 HOSPITAL OBSERVATION PER HR: HCPCS

## 2023-12-15 PROCEDURE — 86900 BLOOD TYPING SEROLOGIC ABO: CPT

## 2023-12-15 PROCEDURE — 80307 DRUG TEST PRSMV CHEM ANLYZR: CPT | Performed by: OBSTETRICS & GYNECOLOGY

## 2023-12-15 PROCEDURE — 25010000002 METHYLERGONOVINE MALEATE PER 0.2 MG: Performed by: NURSE ANESTHETIST, CERTIFIED REGISTERED

## 2023-12-15 PROCEDURE — 25010000002 OXYTOCIN PER 10 UNITS: Performed by: NURSE ANESTHETIST, CERTIFIED REGISTERED

## 2023-12-15 PROCEDURE — 86902 BLOOD TYPE ANTIGEN DONOR EA: CPT

## 2023-12-15 PROCEDURE — 25810000003 LACTATED RINGERS PER 1000 ML: Performed by: OBSTETRICS & GYNECOLOGY

## 2023-12-15 PROCEDURE — 85014 HEMATOCRIT: CPT | Performed by: OBSTETRICS & GYNECOLOGY

## 2023-12-15 PROCEDURE — 85018 HEMOGLOBIN: CPT | Performed by: OBSTETRICS & GYNECOLOGY

## 2023-12-15 PROCEDURE — 25010000002 ONDANSETRON PER 1 MG: Performed by: NURSE ANESTHETIST, CERTIFIED REGISTERED

## 2023-12-15 PROCEDURE — 25010000002 PROPOFOL 200 MG/20ML EMULSION: Performed by: NURSE ANESTHETIST, CERTIFIED REGISTERED

## 2023-12-15 PROCEDURE — 85007 BL SMEAR W/DIFF WBC COUNT: CPT | Performed by: OBSTETRICS & GYNECOLOGY

## 2023-12-15 PROCEDURE — 25010000002 CEFAZOLIN PER 500 MG: Performed by: OBSTETRICS & GYNECOLOGY

## 2023-12-15 PROCEDURE — 85027 COMPLETE CBC AUTOMATED: CPT | Performed by: OBSTETRICS & GYNECOLOGY

## 2023-12-15 PROCEDURE — 96361 HYDRATE IV INFUSION ADD-ON: CPT

## 2023-12-15 RX ORDER — MAGNESIUM HYDROXIDE 1200 MG/15ML
LIQUID ORAL AS NEEDED
Status: DISCONTINUED | OUTPATIENT
Start: 2023-12-15 | End: 2023-12-15 | Stop reason: HOSPADM

## 2023-12-15 RX ORDER — PROMETHAZINE HYDROCHLORIDE 12.5 MG/1
12.5 SUPPOSITORY RECTAL EVERY 6 HOURS PRN
Status: DISCONTINUED | OUTPATIENT
Start: 2023-12-15 | End: 2023-12-16 | Stop reason: HOSPADM

## 2023-12-15 RX ORDER — TRANEXAMIC ACID 100 MG/ML
INJECTION, SOLUTION INTRAVENOUS AS NEEDED
Status: DISCONTINUED | OUTPATIENT
Start: 2023-12-15 | End: 2023-12-15 | Stop reason: SURG

## 2023-12-15 RX ORDER — DOXYCYCLINE 100 MG/1
100 CAPSULE ORAL EVERY 12 HOURS SCHEDULED
Status: DISCONTINUED | OUTPATIENT
Start: 2023-12-15 | End: 2023-12-16 | Stop reason: HOSPADM

## 2023-12-15 RX ORDER — AMOXICILLIN 250 MG
2 CAPSULE ORAL 2 TIMES DAILY PRN
Status: DISCONTINUED | OUTPATIENT
Start: 2023-12-15 | End: 2023-12-16 | Stop reason: HOSPADM

## 2023-12-15 RX ORDER — LOSARTAN POTASSIUM 25 MG/1
25 TABLET ORAL DAILY
COMMUNITY

## 2023-12-15 RX ORDER — CARBOPROST TROMETHAMINE 250 UG/ML
INJECTION, SOLUTION INTRAMUSCULAR AS NEEDED
Status: DISCONTINUED | OUTPATIENT
Start: 2023-12-15 | End: 2023-12-15 | Stop reason: SURG

## 2023-12-15 RX ORDER — IBUPROFEN 800 MG/1
800 TABLET ORAL 3 TIMES DAILY
Status: DISCONTINUED | OUTPATIENT
Start: 2023-12-15 | End: 2023-12-16 | Stop reason: HOSPADM

## 2023-12-15 RX ORDER — ONDANSETRON 2 MG/ML
4 INJECTION INTRAMUSCULAR; INTRAVENOUS EVERY 6 HOURS PRN
Status: DISCONTINUED | OUTPATIENT
Start: 2023-12-15 | End: 2023-12-16 | Stop reason: HOSPADM

## 2023-12-15 RX ORDER — SIMETHICONE 80 MG
80 TABLET,CHEWABLE ORAL 4 TIMES DAILY PRN
Status: DISCONTINUED | OUTPATIENT
Start: 2023-12-15 | End: 2023-12-16 | Stop reason: HOSPADM

## 2023-12-15 RX ORDER — NALOXONE HCL 0.4 MG/ML
0.4 VIAL (ML) INJECTION
Status: DISCONTINUED | OUTPATIENT
Start: 2023-12-15 | End: 2023-12-16 | Stop reason: HOSPADM

## 2023-12-15 RX ORDER — ATENOLOL 25 MG/1
25 TABLET ORAL DAILY
COMMUNITY

## 2023-12-15 RX ORDER — DIPHENHYDRAMINE HYDROCHLORIDE 50 MG/ML
25 INJECTION INTRAMUSCULAR; INTRAVENOUS NIGHTLY PRN
Status: DISCONTINUED | OUTPATIENT
Start: 2023-12-15 | End: 2023-12-16 | Stop reason: HOSPADM

## 2023-12-15 RX ORDER — ACETAMINOPHEN 325 MG/1
650 TABLET ORAL EVERY 6 HOURS PRN
Status: DISCONTINUED | OUTPATIENT
Start: 2023-12-15 | End: 2023-12-15

## 2023-12-15 RX ORDER — MISOPROSTOL 100 UG/1
TABLET ORAL AS NEEDED
Status: DISCONTINUED | OUTPATIENT
Start: 2023-12-15 | End: 2023-12-15 | Stop reason: HOSPADM

## 2023-12-15 RX ORDER — PROMETHAZINE HYDROCHLORIDE 25 MG/1
12.5 TABLET ORAL EVERY 6 HOURS PRN
Status: DISCONTINUED | OUTPATIENT
Start: 2023-12-15 | End: 2023-12-16 | Stop reason: HOSPADM

## 2023-12-15 RX ORDER — SODIUM CHLORIDE, SODIUM LACTATE, POTASSIUM CHLORIDE, CALCIUM CHLORIDE 600; 310; 30; 20 MG/100ML; MG/100ML; MG/100ML; MG/100ML
100 INJECTION, SOLUTION INTRAVENOUS ONCE AS NEEDED
Status: DISCONTINUED | OUTPATIENT
Start: 2023-12-15 | End: 2023-12-15 | Stop reason: HOSPADM

## 2023-12-15 RX ORDER — LOSARTAN POTASSIUM 25 MG/1
25 TABLET ORAL DAILY
Status: DISCONTINUED | OUTPATIENT
Start: 2023-12-16 | End: 2023-12-16 | Stop reason: HOSPADM

## 2023-12-15 RX ORDER — ONDANSETRON 2 MG/ML
INJECTION INTRAMUSCULAR; INTRAVENOUS AS NEEDED
Status: DISCONTINUED | OUTPATIENT
Start: 2023-12-15 | End: 2023-12-15 | Stop reason: SURG

## 2023-12-15 RX ORDER — LIDOCAINE HYDROCHLORIDE 20 MG/ML
INJECTION, SOLUTION EPIDURAL; INFILTRATION; INTRACAUDAL; PERINEURAL AS NEEDED
Status: DISCONTINUED | OUTPATIENT
Start: 2023-12-15 | End: 2023-12-15 | Stop reason: SURG

## 2023-12-15 RX ORDER — OXYCODONE HYDROCHLORIDE AND ACETAMINOPHEN 5; 325 MG/1; MG/1
1 TABLET ORAL ONCE AS NEEDED
Status: DISCONTINUED | OUTPATIENT
Start: 2023-12-15 | End: 2023-12-15 | Stop reason: HOSPADM

## 2023-12-15 RX ORDER — PROPOFOL 10 MG/ML
INJECTION, EMULSION INTRAVENOUS AS NEEDED
Status: DISCONTINUED | OUTPATIENT
Start: 2023-12-15 | End: 2023-12-15 | Stop reason: SURG

## 2023-12-15 RX ORDER — SODIUM CHLORIDE 9 MG/ML
40 INJECTION, SOLUTION INTRAVENOUS AS NEEDED
Status: DISCONTINUED | OUTPATIENT
Start: 2023-12-15 | End: 2023-12-15 | Stop reason: HOSPADM

## 2023-12-15 RX ORDER — SODIUM CHLORIDE, SODIUM LACTATE, POTASSIUM CHLORIDE, CALCIUM CHLORIDE 600; 310; 30; 20 MG/100ML; MG/100ML; MG/100ML; MG/100ML
75 INJECTION, SOLUTION INTRAVENOUS CONTINUOUS
Status: DISCONTINUED | OUTPATIENT
Start: 2023-12-15 | End: 2023-12-15

## 2023-12-15 RX ORDER — ONDANSETRON 2 MG/ML
4 INJECTION INTRAMUSCULAR; INTRAVENOUS AS NEEDED
Status: DISCONTINUED | OUTPATIENT
Start: 2023-12-15 | End: 2023-12-15 | Stop reason: HOSPADM

## 2023-12-15 RX ORDER — HYDROMORPHONE HYDROCHLORIDE 1 MG/ML
0.5 INJECTION, SOLUTION INTRAMUSCULAR; INTRAVENOUS; SUBCUTANEOUS
Status: DISCONTINUED | OUTPATIENT
Start: 2023-12-15 | End: 2023-12-16 | Stop reason: HOSPADM

## 2023-12-15 RX ORDER — SODIUM CHLORIDE 0.9 % (FLUSH) 0.9 %
10 SYRINGE (ML) INJECTION EVERY 12 HOURS SCHEDULED
Status: DISCONTINUED | OUTPATIENT
Start: 2023-12-15 | End: 2023-12-15 | Stop reason: HOSPADM

## 2023-12-15 RX ORDER — FAMOTIDINE 10 MG/ML
INJECTION, SOLUTION INTRAVENOUS AS NEEDED
Status: DISCONTINUED | OUTPATIENT
Start: 2023-12-15 | End: 2023-12-15 | Stop reason: SURG

## 2023-12-15 RX ORDER — ONDANSETRON 4 MG/1
4 TABLET, FILM COATED ORAL EVERY 6 HOURS PRN
Status: DISCONTINUED | OUTPATIENT
Start: 2023-12-15 | End: 2023-12-16 | Stop reason: HOSPADM

## 2023-12-15 RX ORDER — DIPHENHYDRAMINE HCL 25 MG
25 CAPSULE ORAL NIGHTLY PRN
Status: DISCONTINUED | OUTPATIENT
Start: 2023-12-15 | End: 2023-12-16 | Stop reason: HOSPADM

## 2023-12-15 RX ORDER — ATENOLOL 25 MG/1
25 TABLET ORAL DAILY
Status: DISCONTINUED | OUTPATIENT
Start: 2023-12-16 | End: 2023-12-16 | Stop reason: HOSPADM

## 2023-12-15 RX ORDER — IPRATROPIUM BROMIDE AND ALBUTEROL SULFATE 2.5; .5 MG/3ML; MG/3ML
3 SOLUTION RESPIRATORY (INHALATION) ONCE AS NEEDED
Status: DISCONTINUED | OUTPATIENT
Start: 2023-12-15 | End: 2023-12-15 | Stop reason: HOSPADM

## 2023-12-15 RX ORDER — OXYCODONE HYDROCHLORIDE AND ACETAMINOPHEN 5; 325 MG/1; MG/1
1 TABLET ORAL EVERY 4 HOURS PRN
Status: DISCONTINUED | OUTPATIENT
Start: 2023-12-15 | End: 2023-12-16 | Stop reason: HOSPADM

## 2023-12-15 RX ORDER — SODIUM CHLORIDE 0.9 % (FLUSH) 0.9 %
10 SYRINGE (ML) INJECTION AS NEEDED
Status: DISCONTINUED | OUTPATIENT
Start: 2023-12-15 | End: 2023-12-15 | Stop reason: HOSPADM

## 2023-12-15 RX ORDER — METOCLOPRAMIDE HYDROCHLORIDE 5 MG/ML
5 INJECTION INTRAMUSCULAR; INTRAVENOUS EVERY 6 HOURS PRN
Status: DISCONTINUED | OUTPATIENT
Start: 2023-12-15 | End: 2023-12-16 | Stop reason: HOSPADM

## 2023-12-15 RX ORDER — MEPERIDINE HYDROCHLORIDE 25 MG/ML
12.5 INJECTION INTRAMUSCULAR; INTRAVENOUS; SUBCUTANEOUS
Status: DISCONTINUED | OUTPATIENT
Start: 2023-12-15 | End: 2023-12-15 | Stop reason: HOSPADM

## 2023-12-15 RX ORDER — OXYTOCIN 10 [USP'U]/ML
INJECTION, SOLUTION INTRAMUSCULAR; INTRAVENOUS AS NEEDED
Status: DISCONTINUED | OUTPATIENT
Start: 2023-12-15 | End: 2023-12-15 | Stop reason: SURG

## 2023-12-15 RX ORDER — METHYLERGONOVINE MALEATE 0.2 MG/ML
INJECTION INTRAVENOUS AS NEEDED
Status: DISCONTINUED | OUTPATIENT
Start: 2023-12-15 | End: 2023-12-15 | Stop reason: SURG

## 2023-12-15 RX ORDER — FENTANYL CITRATE 50 UG/ML
50 INJECTION, SOLUTION INTRAMUSCULAR; INTRAVENOUS
Status: DISCONTINUED | OUTPATIENT
Start: 2023-12-15 | End: 2023-12-15 | Stop reason: HOSPADM

## 2023-12-15 RX ORDER — OXYCODONE AND ACETAMINOPHEN 10; 325 MG/1; MG/1
1 TABLET ORAL EVERY 4 HOURS PRN
Status: DISCONTINUED | OUTPATIENT
Start: 2023-12-15 | End: 2023-12-16 | Stop reason: HOSPADM

## 2023-12-15 RX ORDER — SODIUM CHLORIDE, SODIUM LACTATE, POTASSIUM CHLORIDE, CALCIUM CHLORIDE 600; 310; 30; 20 MG/100ML; MG/100ML; MG/100ML; MG/100ML
125 INJECTION, SOLUTION INTRAVENOUS CONTINUOUS
Status: DISCONTINUED | OUTPATIENT
Start: 2023-12-15 | End: 2023-12-16 | Stop reason: HOSPADM

## 2023-12-15 RX ORDER — HALOPERIDOL 5 MG/ML
1 INJECTION INTRAMUSCULAR ONCE
Status: DISCONTINUED | OUTPATIENT
Start: 2023-12-15 | End: 2023-12-16 | Stop reason: HOSPADM

## 2023-12-15 RX ADMIN — PROPOFOL 150 MG: 10 INJECTION, EMULSION INTRAVENOUS at 13:03

## 2023-12-15 RX ADMIN — IBUPROFEN 800 MG: 800 TABLET, FILM COATED ORAL at 16:29

## 2023-12-15 RX ADMIN — PROPOFOL 150 MG: 10 INJECTION, EMULSION INTRAVENOUS at 12:48

## 2023-12-15 RX ADMIN — FENTANYL CITRATE 50 MCG: 50 INJECTION, SOLUTION INTRAMUSCULAR; INTRAVENOUS at 13:55

## 2023-12-15 RX ADMIN — IBUPROFEN 800 MG: 800 TABLET, FILM COATED ORAL at 20:06

## 2023-12-15 RX ADMIN — OXYTOCIN 20 UNITS: 10 INJECTION INTRAVENOUS at 13:00

## 2023-12-15 RX ADMIN — DOXYCYCLINE 100 MG: 100 INJECTION, POWDER, LYOPHILIZED, FOR SOLUTION INTRAVENOUS at 14:00

## 2023-12-15 RX ADMIN — SODIUM CHLORIDE, POTASSIUM CHLORIDE, SODIUM LACTATE AND CALCIUM CHLORIDE 75 ML/HR: 600; 310; 30; 20 INJECTION, SOLUTION INTRAVENOUS at 03:47

## 2023-12-15 RX ADMIN — DOXYCYCLINE 100 MG: 100 CAPSULE ORAL at 21:19

## 2023-12-15 RX ADMIN — PROPOFOL 150 MG: 10 INJECTION, EMULSION INTRAVENOUS at 13:13

## 2023-12-15 RX ADMIN — LIDOCAINE HYDROCHLORIDE 60 MG: 20 INJECTION, SOLUTION EPIDURAL; INFILTRATION; INTRACAUDAL; PERINEURAL at 12:45

## 2023-12-15 RX ADMIN — CARBOPROST TROMETHAMINE 250 MCG: 250 INJECTION, SOLUTION INTRAMUSCULAR at 13:09

## 2023-12-15 RX ADMIN — PROPOFOL 150 MG: 10 INJECTION, EMULSION INTRAVENOUS at 12:45

## 2023-12-15 RX ADMIN — METHYLERGONOVINE MALEATE 200 MCG: 0.2 INJECTION, SOLUTION INTRAMUSCULAR; INTRAVENOUS at 13:04

## 2023-12-15 RX ADMIN — PROPOFOL 150 MG: 10 INJECTION, EMULSION INTRAVENOUS at 13:19

## 2023-12-15 RX ADMIN — CEFAZOLIN 2000 MG: 2 INJECTION, POWDER, FOR SOLUTION INTRAMUSCULAR; INTRAVENOUS at 10:16

## 2023-12-15 RX ADMIN — PROPOFOL 100 MG: 10 INJECTION, EMULSION INTRAVENOUS at 12:51

## 2023-12-15 RX ADMIN — ACETAMINOPHEN 650 MG: 325 TABLET ORAL at 02:50

## 2023-12-15 RX ADMIN — TRANEXAMIC ACID 1000 MG: 100 INJECTION, SOLUTION INTRAVENOUS at 13:00

## 2023-12-15 RX ADMIN — FAMOTIDINE 20 MG: 10 INJECTION, SOLUTION INTRAVENOUS at 12:41

## 2023-12-15 RX ADMIN — SODIUM CHLORIDE, POTASSIUM CHLORIDE, SODIUM LACTATE AND CALCIUM CHLORIDE 125 ML/HR: 600; 310; 30; 20 INJECTION, SOLUTION INTRAVENOUS at 16:22

## 2023-12-15 RX ADMIN — CEFAZOLIN 2000 MG: 2 INJECTION, POWDER, FOR SOLUTION INTRAMUSCULAR; INTRAVENOUS at 03:51

## 2023-12-15 RX ADMIN — ONDANSETRON 4 MG: 2 INJECTION INTRAMUSCULAR; INTRAVENOUS at 12:41

## 2023-12-15 NOTE — ANESTHESIA PROCEDURE NOTES
Airway  Urgency: elective    Date/Time: 12/15/2023 12:45 PM    General Information and Staff    Patient location during procedure: OR  CRNA/CAA: Nicol Robertson CRNA    Indications and Patient Condition    Preoxygenated: yes  Mask difficulty assessment: 0 - not attempted    Final Airway Details  Final airway type: supraglottic airway      Successful airway: unique  Size 4     Number of attempts at approach: 1  Assessment: lips, teeth, and gum same as pre-op and atraumatic intubation

## 2023-12-15 NOTE — PLAN OF CARE
Goal Outcome Evaluation:  Plan of Care Reviewed With: patient   Pt 's VSS; vaginal bleeding at this time scant. Has completed 2nd unit PRC's and tolerated infusion well. Pt has been NPO.  States headache and abd cramping earlier and has eased.     Progress: improving

## 2023-12-15 NOTE — ANESTHESIA POSTPROCEDURE EVALUATION
Patient: Gisel Mena    Procedure Summary       Date: 12/15/23 Room / Location:  [facility] OR   [facility] OR    Anesthesia Start: 1239 Anesthesia Stop: 1332    Procedure: DILATATION AND CURETTAGE WITH SUCTION (Vagina) Diagnosis:       Incomplete       (Incomplete  [O03.4])    Surgeons: Ana Thorpe DO Provider: Kurt Craig DO    Anesthesia Type: general ASA Status: 2            Anesthesia Type: general    Vitals  Vitals Value Taken Time   /66 12/15/23 1432   Temp 97.3 °F (36.3 °C) 12/15/23 1431   Pulse 58 12/15/23 1436   Resp 16 12/15/23 1431   SpO2 100 % 12/15/23 1436   Vitals shown include unfiled device data.        Post Anesthesia Care and Evaluation    Patient location during evaluation: PACU  Patient participation: complete - patient participated  Level of consciousness: sleepy but conscious and responsive to noxious stimuli  Pain score: 0  Pain management: adequate    Airway patency: patent  Anesthetic complications: No anesthetic complications  PONV Status: controlled  Cardiovascular status: acceptable and stable  Respiratory status: acceptable and nasal cannula  Hydration status: euvolemic  No anesthesia care post op

## 2023-12-15 NOTE — OP NOTE
Suction D&C Operation Note    Gisel Mena  12/15/2023    Pre-op Diagnosis:   Incomplete  [O03.4]  Acute Blood loss anemia  Hemorrhage  Substance abuse    Post-op Diagnosis:     Post-Op Diagnosis Codes:     * Incomplete  [O03.4]       Postpartum Hemorrhage       Acute Blood loss anemia    Procedure(s):  DILATATION AND CURETTAGE WITH SUCTION     Surgeon(s):  Ana Thorpe DO    Anesthesia: General    Staff:   Circulator: Zayra Lai RN  Scrub Person: Renee Hines; Concepción Marquez    Estimated Blood Loss:  800    Specimens:                Order Name Source Comment Collection Info Order Time   TISSUE EXAM, P&C LABS ([facility], [facility], [facility]) Products of Conception  Collected By: Ana Thorpe DO 12/15/2023  1:13 PM     Release to patient   Routine Release            Grafts/Implants: None  Procedure     The patient was prepped and draped in normal sterile fashion. The lower extremities were carefully placed in yellow fin stirrups. A Floyd speculum was inserted into the vagina and the cervix was visualized.  The anterior lip was grasped with a single tooth tenaculum.  The cervix was already dilated.  A number 10 suction currete was inserted and the uterine contents were evacuated with multiple passes.  A sharp curettage was then performed noting a gritty texture to the uterus.  A final pass with the suction curette was performed.  Patient was continuing to have brisk bleeding from the uterus.  800 mcg of Cytotec was placed rectally.  Pitocin was placed and IV fluids.  Tranxene make acid was given as well as Methergine and Hemabate.  4th unit of blood was called for and third unit of blood was already hanging.  Bleeding did finally stop.  Patient is stabilized.  All instruments were removed from the patient. Sponge, lap and needle counts were correct.  The patient t was taken t to recovery room in stable condition.     Complications: Postpartum hemorrhage  Ana Thorpe DO      Date: 12/15/2023  Time: 13:25 EST

## 2023-12-15 NOTE — H&P
[facility]   HISTORY AND PHYSICAL    Patient Name: Gisel Mena  : 1990  MRN: [id number]  Primary Care Physician:  Juwan Paredes MD  Date of admission: 2023    Subjective   Subjective     Chief Complaint: Vaginal bleeding    HPI:    Gisel Mena is a 33 y.o. female   who was admitted from the ER last night with severe anemia following a spontaneous  and heavy vaginal bleeding. Bleeding persits but is not heavy right now. She c/o vaginal bleeding and passing clots with lower abdominal pain at home prior to arrival in the emergency room. Ultrasound suggests retained products of conception.     Review of Systems   All systems were reviewed and negative except for: lower abdominal pain and vaginal bleeding.     Personal History     Past Medical History:   Diagnosis Date    Lupus     Substance abuse     Urinary tract infection        Past Surgical History:   Procedure Laterality Date    ADENOIDECTOMY      APPENDECTOMY      CHOLECYSTECTOMY      TONSILLECTOMY         Family History: family history includes Congenital heart disease in her mother; Diabetes in her mother. Otherwise pertinent FHx was reviewed and not pertinent to current issue.    Social History:  reports that she has been smoking. She has been smoking an average of 3 packs per day. She has never used smokeless tobacco. She reports that she does not drink alcohol and does not use drugs.    Home Medications:  docusate sodium, ferrous sulfate, ibuprofen, and labetalol      Allergies:  No Known Allergies    Objective   Objective     Vitals:   Temp:  [97.3 °F (36.3 °C)-98.6 °F (37 °C)] 98.3 °F (36.8 °C)  Heart Rate:  [] 88  Resp:  [14-22] 14  BP: (109-161)/() 113/67  Physical Exam    Constitutional: Sleepy but arousable and responding to questions.    Eyes: PERRLA, sclerae anicteric, conjunctival pallor present.    HENT: NCAT, mucous membranes moist   Neck: Supple, no thyromegaly, no  lymphadenopathy, trachea midline   Respiratory: Clear to auscultation bilaterally, nonlabored respirations    Cardiovascular: RRR, no murmurs, rubs, or gallops, palpable pedal pulses bilaterally   Gastrointestinal: Positive bowel sounds, Uterus palpated below the umbilicus.    Musculoskeletal: No bilateral ankle edema, no clubbing or cyanosis to extremities   Psychiatric: Appropriate affect, cooperative   Neurologic: Oriented x 3, strength symmetric in all extremities, Cranial Nerves grossly intact to confrontation, speech clear   Skin: No rashes     Result Review    Result Review:  I have personally reviewed the results from the time of this admission to 12/15/2023 09:16 EST and agree with these findings:  [x]  Laboratory  []  Microbiology  [x]  Radiology  []  EKG/Telemetry   []  Cardiology/Vascular   []  Pathology  []  Old records  []  Other:  Most notable findings include: Heterogenous thickened endometrium 6 cm with post partum uterus.  CBC stable.   Assessment & Plan   Assessment / Plan     Brief Patient Summary:  Gisel Mena is a 33 y.o. female who is seen in the emergency room with vaginal bleeding, anemia and spontaneous abortoin. S/p transfusion of 2 units of PRBC.   I have reviewed the ultrasound images and laboratory test results.  I recommended suction dilatation and curettage.    Active Hospital Problems:  Active Hospital Problems    Diagnosis     **Anemia due to acute blood loss     Miscarriage     Incomplete       Plan:   For suction dilatation and curettage. Counseling on procedure risk and benefits done. Patient has given informed consent for suction dilatation and curettage.   DVT prophylaxis:  No DVT prophylaxis order currently exists.    CODE STATUS:       Admission Status: D & C then observation.

## 2023-12-15 NOTE — ANESTHESIA PREPROCEDURE EVALUATION
Anesthesia Evaluation     Patient summary reviewed and Nursing notes reviewed   no history of anesthetic complications:   NPO Solid Status: > 8 hours  NPO Liquid Status: > 8 hours           Airway   Mallampati: II  TM distance: >3 FB  Neck ROM: full  No difficulty expected  Dental    (+) poor dentition    Pulmonary - normal exam    breath sounds clear to auscultation  (+) a smoker Current, Abstained day of surgery, vape,  Cardiovascular - negative cardio ROS and normal exam    ECG reviewed  Rhythm: regular  Rate: normal        Neuro/Psych- negative ROS  GI/Hepatic/Renal/Endo    (+) obesity    Musculoskeletal (-) negative ROS    Abdominal   (+) obese   Substance History   (+) drug use (Hx poly substance abuse)     OB/GYN    (+) Pregnant (missed Ab)        Other   blood dyscrasia anemia,         Phys Exam Other: Ms. Mena is very somnolent and poorly arousable in pre-op.                   Anesthesia Plan    ASA 2     general     (UA done at 1014 is positive for Amphetamines and Meth. )  intravenous induction     Anesthetic plan, risks, benefits, and alternatives have been provided, discussed and informed consent has been obtained with: patient.  Pre-procedure education provided  Use of blood products discussed with patient  Consented to blood products.    Plan discussed with CRNA.      CODE STATUS:    Code Status (Patient has no pulse and is not breathing): CPR (Attempt to Resuscitate)  Medical Interventions (Patient has pulse or is breathing): Full Support

## 2023-12-15 NOTE — PROGRESS NOTES
Called to see patient secondary to the large amount of vaginal bleeding.  Patient is field up to check pads full of blood.  Hemoglobin 7.3 prior to this after 2 units of packed red blood cells.  Patient counseled to proceed urgently now with suction D&C.  Risk of further bleeding uterine perforation anesthesia hemorrhage transfusion are all reviewed with patient.  She admits understanding and agrees to proceed.  OR is notified and transportation is on their way to receive patient.

## 2023-12-15 NOTE — ED NOTES
Pt provided bedside commode for use, pt did not want help from ED Tech to maneuver from bed to bedside. Pt has visitor that verbally stated they would help pt, pt instructed to press call light for help.

## 2023-12-15 NOTE — PLAN OF CARE
Goal Outcome Evaluation:      Patient is resting in bed at this time. IV fluids infusing at this time. Patient has scant bleeding noted at this time. Patient will go to OR today for D&C. Will continue to monitor.

## 2023-12-16 VITALS
OXYGEN SATURATION: 100 % | TEMPERATURE: 98.1 F | RESPIRATION RATE: 20 BRPM | HEART RATE: 93 BPM | HEIGHT: 68 IN | SYSTOLIC BLOOD PRESSURE: 129 MMHG | WEIGHT: 220 LBS | BODY MASS INDEX: 33.34 KG/M2 | DIASTOLIC BLOOD PRESSURE: 58 MMHG

## 2023-12-16 PROBLEM — F15.10 METHAMPHETAMINE ABUSE: Status: ACTIVE | Noted: 2023-12-16

## 2023-12-16 PROBLEM — O36.0990 RH ISOIMMUNIZATION: Chronic | Status: ACTIVE | Noted: 2023-12-16

## 2023-12-16 PROBLEM — Z98.890 STATUS POST D&C: Status: ACTIVE | Noted: 2023-12-16

## 2023-12-16 PROBLEM — Z92.89 HISTORY OF RECENT BLOOD TRANSFUSION: Status: ACTIVE | Noted: 2023-12-16

## 2023-12-16 LAB
ALBUMIN SERPL-MCNC: 2.9 G/DL (ref 3.5–5.2)
ALBUMIN/GLOB SERPL: 1.4 G/DL
ALP SERPL-CCNC: 59 U/L (ref 39–117)
ALT SERPL W P-5'-P-CCNC: 6 U/L (ref 1–33)
ANION GAP SERPL CALCULATED.3IONS-SCNC: 7.4 MMOL/L (ref 5–15)
AST SERPL-CCNC: 13 U/L (ref 1–32)
BASOPHILS # BLD AUTO: 0.08 10*3/MM3 (ref 0–0.2)
BASOPHILS NFR BLD AUTO: 0.6 % (ref 0–1.5)
BH BB BLOOD EXPIRATION DATE: NORMAL
BH BB BLOOD EXPIRATION DATE: NORMAL
BH BB BLOOD TYPE BARCODE: 8400
BH BB BLOOD TYPE BARCODE: 8400
BH BB DISPENSE STATUS: NORMAL
BH BB DISPENSE STATUS: NORMAL
BH BB PRODUCT CODE: NORMAL
BH BB PRODUCT CODE: NORMAL
BH BB UNIT NUMBER: NORMAL
BH BB UNIT NUMBER: NORMAL
BILIRUB SERPL-MCNC: 0.2 MG/DL (ref 0–1.2)
BUN SERPL-MCNC: 7 MG/DL (ref 6–20)
BUN/CREAT SERPL: 7.9 (ref 7–25)
CALCIUM SPEC-SCNC: 7.9 MG/DL (ref 8.6–10.5)
CHLORIDE SERPL-SCNC: 106 MMOL/L (ref 98–107)
CO2 SERPL-SCNC: 25.6 MMOL/L (ref 22–29)
CREAT SERPL-MCNC: 0.89 MG/DL (ref 0.57–1)
CROSSMATCH INTERPRETATION: NORMAL
CROSSMATCH INTERPRETATION: NORMAL
DEPRECATED RDW RBC AUTO: 54.3 FL (ref 37–54)
EGFRCR SERPLBLD CKD-EPI 2021: 87.9 ML/MIN/1.73
EOSINOPHIL # BLD AUTO: 0.01 10*3/MM3 (ref 0–0.4)
EOSINOPHIL NFR BLD AUTO: 0.1 % (ref 0.3–6.2)
ERYTHROCYTE [DISTWIDTH] IN BLOOD BY AUTOMATED COUNT: 19.1 % (ref 12.3–15.4)
GLOBULIN UR ELPH-MCNC: 2.1 GM/DL
GLUCOSE SERPL-MCNC: 125 MG/DL (ref 65–99)
HCT VFR BLD AUTO: 23 % (ref 34–46.6)
HGB BLD-MCNC: 7.2 G/DL (ref 12–15.9)
IMM GRANULOCYTES # BLD AUTO: 0.04 10*3/MM3 (ref 0–0.05)
IMM GRANULOCYTES NFR BLD AUTO: 0.3 % (ref 0–0.5)
LYMPHOCYTES # BLD AUTO: 2.7 10*3/MM3 (ref 0.7–3.1)
LYMPHOCYTES NFR BLD AUTO: 19.7 % (ref 19.6–45.3)
MCH RBC QN AUTO: 24.6 PG (ref 26.6–33)
MCHC RBC AUTO-ENTMCNC: 31.3 G/DL (ref 31.5–35.7)
MCV RBC AUTO: 78.5 FL (ref 79–97)
MONOCYTES # BLD AUTO: 0.93 10*3/MM3 (ref 0.1–0.9)
MONOCYTES NFR BLD AUTO: 6.8 % (ref 5–12)
NEUTROPHILS NFR BLD AUTO: 72.5 % (ref 42.7–76)
NEUTROPHILS NFR BLD AUTO: 9.96 10*3/MM3 (ref 1.7–7)
NRBC BLD AUTO-RTO: 0 /100 WBC (ref 0–0.2)
PLATELET # BLD AUTO: 255 10*3/MM3 (ref 140–450)
PMV BLD AUTO: 10.4 FL (ref 6–12)
POTASSIUM SERPL-SCNC: 3.8 MMOL/L (ref 3.5–5.2)
PROT SERPL-MCNC: 5 G/DL (ref 6–8.5)
RBC # BLD AUTO: 2.93 10*6/MM3 (ref 3.77–5.28)
SODIUM SERPL-SCNC: 139 MMOL/L (ref 136–145)
UNIT  ABO: NORMAL
UNIT  ABO: NORMAL
UNIT  RH: NORMAL
UNIT  RH: NORMAL
WBC NRBC COR # BLD AUTO: 13.72 10*3/MM3 (ref 3.4–10.8)

## 2023-12-16 PROCEDURE — 96365 THER/PROPH/DIAG IV INF INIT: CPT

## 2023-12-16 PROCEDURE — 80053 COMPREHEN METABOLIC PANEL: CPT | Performed by: OBSTETRICS & GYNECOLOGY

## 2023-12-16 PROCEDURE — 96366 THER/PROPH/DIAG IV INF ADDON: CPT

## 2023-12-16 PROCEDURE — 85025 COMPLETE CBC W/AUTO DIFF WBC: CPT | Performed by: OBSTETRICS & GYNECOLOGY

## 2023-12-16 PROCEDURE — 25810000003 LACTATED RINGERS PER 1000 ML: Performed by: OBSTETRICS & GYNECOLOGY

## 2023-12-16 PROCEDURE — 25010000002 IRON SUCROSE PER 1 MG: Performed by: OBSTETRICS & GYNECOLOGY

## 2023-12-16 PROCEDURE — 25810000003 SODIUM CHLORIDE 0.9 % SOLUTION 250 ML FLEX CONT: Performed by: OBSTETRICS & GYNECOLOGY

## 2023-12-16 PROCEDURE — G0378 HOSPITAL OBSERVATION PER HR: HCPCS

## 2023-12-16 RX ORDER — FERROUS SULFATE 325(65) MG
325 TABLET ORAL 2 TIMES DAILY WITH MEALS
Qty: 60 TABLET | Refills: 1 | Status: SHIPPED | OUTPATIENT
Start: 2023-12-16

## 2023-12-16 RX ORDER — IBUPROFEN 600 MG/1
600 TABLET ORAL EVERY 6 HOURS PRN
Qty: 40 TABLET | Refills: 0 | Status: SHIPPED | OUTPATIENT
Start: 2023-12-16

## 2023-12-16 RX ADMIN — DOXYCYCLINE 100 MG: 100 CAPSULE ORAL at 08:48

## 2023-12-16 RX ADMIN — IBUPROFEN 800 MG: 800 TABLET, FILM COATED ORAL at 08:48

## 2023-12-16 RX ADMIN — SODIUM CHLORIDE, POTASSIUM CHLORIDE, SODIUM LACTATE AND CALCIUM CHLORIDE 125 ML/HR: 600; 310; 30; 20 INJECTION, SOLUTION INTRAVENOUS at 00:59

## 2023-12-16 RX ADMIN — IRON SUCROSE 400 MG: 20 INJECTION, SOLUTION INTRAVENOUS at 12:13

## 2023-12-16 RX ADMIN — SODIUM CHLORIDE, POTASSIUM CHLORIDE, SODIUM LACTATE AND CALCIUM CHLORIDE 125 ML/HR: 600; 310; 30; 20 INJECTION, SOLUTION INTRAVENOUS at 10:34

## 2023-12-16 NOTE — PLAN OF CARE
Goal Outcome Evaluation:      Patient will be discharged home when iron infusion completed.  Patient has been afebrile, A & O x 4 but has episodes of drowsiness.  Patient is up ad porter to toilet; denies bleeding and pain/discomfort.  Patient's significant other at bedside and will be taking patient home today.  Discharge information including educational materials will be given at discharge.

## 2023-12-16 NOTE — DISCHARGE SUMMARY
Discharge Summary    Admit Date: 12/14/2023  5:29 PM    Admit Diagnosis: Miscarriage [O03.9]    Date of Discharge:  12/16/2023    Discharge Diagnosis: Same        Hospital Course  Patient is a 33 y.o. female admitted secondary to missed AB and hemorrhage.  Patient received 2 units of blood prior to proceeding with a suction D&C.  Patient received 2 more units of blood during her procedure with a second postpartum hemorrhage.  Patient received transonic acid, Hemabate, Methergine, Pitocin, and Cytotec during her procedure as well.  Of note patient had an anticalin antibody noted during her type and cross.  It is possible that the anti-K antibody could have led to miscarriage.  I have discussed with patient this is very significant and will need to be further worked up in the office before trying to conceive again.  Patient has received IV iron prior to discharge and will be discharged on oral iron.  At her follow-up visit she will need CBC antibody screen with titers and to discuss her future fertility desires pending those results.  Of note patient also positive for methamphetamines during her admission.  Patient doing better today and having minimal spotting.  Patient tolerating a regular diet and ambulating without difficulty. Will discharge to home today on pelvic rest with follow-up in the office in at least 2 weeks.        Vital Signs  Temp:  [97.1 °F (36.2 °C)-98.3 °F (36.8 °C)] 98 °F (36.7 °C)  Heart Rate:  [] 86  Resp:  [12-18] 14  BP: ()/() 99/55    Review of Systems    The following systems were reviewed and negative;  ENT, respiratory, cardiovascular, gastrointestinal, genitourinary, breast, endocrine and allergies / immunologic.    Physical Examination: General appearance - alert, well appearing, and in no distress  Chest - clear to auscultation, no wheezes, rales or rhonchi, symmetric air entry  Heart - normal rate, regular rhythm, normal S1, S2, no murmurs, rubs, clicks or  gallops  Abdomen - soft, nontender, nondistended, no masses or organomegaly  Extremities - peripheral pulses normal, no pedal edema, no clubbing or cyanosis    Condition on Discharge:  Stable    Urine Output Good    Discharge Diet: Regular    Follow-up Appointments  Patient will follow up in clinic 1-2 weeks.       Ana Thorpe DO  12/16/23  11:25 EST

## 2023-12-16 NOTE — DISCHARGE INSTR - ACTIVITY
Complete pelvic rest for 6 weeks.  This includes no sex, no tampons, no douching, no swimming, no tub baths;  you may shower.      Seek medical advice for chills or a temp of 100.5 or above.  Abdominal cramps unrelieved by motrin or tylenol.  Or bleeding that becomes heavy or contains large clots

## 2023-12-16 NOTE — DISCHARGE INSTRUCTIONS
Complete pelvic  rest for 6 weeks.  This includes no sex no tampons no douching no swimming no tub baths;  you may shower.     Seek medical advice for chills or a temp of 100.5 or above.  Abdominal cramps unrelieved by motrin or tylenol. Or bleeding that becomes heavy or contains large clots

## 2023-12-16 NOTE — NURSING NOTE
Patient had reported to her nurse she want to leave AMA.  I went and spoke to the patient and explained the risk of leaving and how is was important for us to watch her lab values to make sure she would not need more blood products because she has already had to receive multiple units. . Patient understanding and is agreeable to stay at this time.

## 2023-12-17 NOTE — PAYOR COMM NOTE
"[facility]  PHONE  [phone]  FAX  [phone]  NPI:  [id number]    PATIENT D/C 12/16/2023    Gisel Mena \"Jessica\" (33 y.o. Female)       Date of Birth   1990    Social Security Number       Address   [address] [place] [address]    Home Phone   [phone]    MRN   [id number]       Orthodoxy   None    Marital Status                               Admission Date   12/14/23    Admission Type   Emergency    Admitting Provider   Man Moody MD    Attending Provider       Department, Room/Bed   [facility], W234/1       Discharge Date   12/16/2023    Discharge Disposition   Home or Self Care    Discharge Destination   Home                              Attending Provider: (none)   Allergies: No Known Allergies    Isolation: None   Infection: None   Code Status: Prior    Ht: 172.7 cm (68\")   Wt: 99.8 kg (220 lb)    Admission Cmt: None   Principal Problem: Anemia due to acute blood loss [D62]                   Active Insurance as of 12/14/2023       Primary Coverage       Payor Plan Insurance Group Employer/Plan Group    [employer] [place] [employer] [place]ICA[place]        Payor Plan Address Payor Plan Phone Number Payor Plan Fax Number Effective Dates    [address] [address] [phone]  3/1/2020 - None Entered    [facility] [id number]         Subscriber Name Subscriber Birth Date Member ID       GISEL MENA 1990 [id number]                     Emergency Contacts        (Rel.) Home Phone Work Phone Mobile Phone    Bharati Reddy (Friend) [phone] -- --    Michele Fletcher (Friend) -- -- [phone]                "

## 2023-12-20 LAB — REF LAB TEST METHOD: NORMAL

## 2025-02-07 ENCOUNTER — TELEMEDICINE (OUTPATIENT)
Dept: FAMILY MEDICINE CLINIC | Facility: TELEHEALTH | Age: 35
End: 2025-02-07
Payer: COMMERCIAL

## 2025-02-07 DIAGNOSIS — J40 BRONCHITIS: ICD-10-CM

## 2025-02-07 DIAGNOSIS — J01.40 ACUTE PANSINUSITIS, RECURRENCE NOT SPECIFIED: Primary | ICD-10-CM

## 2025-02-07 RX ORDER — ALBUTEROL SULFATE 90 UG/1
2 INHALANT RESPIRATORY (INHALATION) EVERY 4 HOURS PRN
Qty: 18 G | Refills: 0 | Status: SHIPPED | OUTPATIENT
Start: 2025-02-07

## 2025-02-07 RX ORDER — BENZONATATE 200 MG/1
200 CAPSULE ORAL 3 TIMES DAILY PRN
Qty: 21 CAPSULE | Refills: 0 | Status: SHIPPED | OUTPATIENT
Start: 2025-02-07

## 2025-02-07 RX ORDER — PREDNISONE 10 MG/1
TABLET ORAL
Qty: 21 TABLET | Refills: 0 | Status: SHIPPED | OUTPATIENT
Start: 2025-02-07

## 2025-02-07 NOTE — PATIENT INSTRUCTIONS
Sinus Infection, Adult  A sinus infection, also called sinusitis, is inflammation of your sinuses. Sinuses are hollow spaces in the bones around your face. Your sinuses are located:  Around your eyes.  In the middle of your forehead.  Behind your nose.  In your cheekbones.  Mucus normally drains out of your sinuses. When your nasal tissues become inflamed or swollen, mucus can become trapped or blocked. This allows bacteria, viruses, and fungi to grow, which leads to infection. Most infections of the sinuses are caused by a virus.  A sinus infection can develop quickly. It can last for up to 4 weeks (acute) or for more than 12 weeks (chronic). A sinus infection often develops after a cold.  What are the causes?  This condition is caused by anything that creates swelling in the sinuses or stops mucus from draining. This includes:  Allergies.  Asthma.  Infection from bacteria or viruses.  Deformities or blockages in your nose or sinuses.  Abnormal growths in the nose (nasal polyps).  Pollutants, such as chemicals or irritants in the air.  Infection from fungi. This is rare.  What increases the risk?  You are more likely to develop this condition if you:  Have a weak body defense system (immune system).  Do a lot of swimming or diving.  Overuse nasal sprays.  Smoke.  What are the signs or symptoms?  The main symptoms of this condition are pain and a feeling of pressure around the affected sinuses. Other symptoms include:  Stuffy nose or congestion that makes it difficult to breathe through your nose.  Thick yellow or greenish drainage from your nose.  Tenderness, swelling, and warmth over the affected sinuses.  A cough that may get worse at night.  Decreased sense of smell and taste.  Extra mucus that collects in the throat or the back of the nose (postnasal drip) causing a sore throat or bad breath.  Tiredness (fatigue).  Fever.  How is this diagnosed?  This condition is diagnosed based on:  Your symptoms.  Your  medical history.  A physical exam.  Tests to find out if your condition is acute or chronic. This may include:  Checking your nose for nasal polyps.  Viewing your sinuses using a device that has a light (endoscope).  Testing for allergies or bacteria.  Imaging tests, such as an MRI or CT scan.  In rare cases, a bone biopsy may be done to rule out more serious types of fungal sinus disease.  How is this treated?  Treatment for a sinus infection depends on the cause and whether your condition is chronic or acute.  If caused by a virus, your symptoms should go away on their own within 10 days. You may be given medicines to relieve symptoms. They include:  Medicines that shrink swollen nasal passages (decongestants).  A spray that eases inflammation of the nostrils (topical intranasal corticosteroids).  Rinses that help get rid of thick mucus in your nose (nasal saline washes).  Medicines that treat allergies (antihistamines).  Over-the-counter pain relievers.  If caused by bacteria, your health care provider may recommend waiting to see if your symptoms improve. Most bacterial infections will get better without antibiotic medicine. You may be given antibiotics if you have:  A severe infection.  A weak immune system.  If caused by narrow nasal passages or nasal polyps, surgery may be needed.  Follow these instructions at home:  Medicines  Take, use, or apply over-the-counter and prescription medicines only as told by your health care provider. These may include nasal sprays.  If you were prescribed an antibiotic medicine, take it as told by your health care provider. Do not stop taking the antibiotic even if you start to feel better.  Hydrate and humidify    Drink enough fluid to keep your urine pale yellow. Staying hydrated will help to thin your mucus.  Use a cool mist humidifier to keep the humidity level in your home above 50%.  Inhale steam for 10-15 minutes, 3-4 times a day, or as told by your health care  provider. You can do this in the bathroom while a hot shower is running.  Limit your exposure to cool or dry air.  Rest  Rest as much as possible.  Sleep with your head raised (elevated).  Make sure you get enough sleep each night.  General instructions    Apply a warm, moist washcloth to your face 3-4 times a day or as told by your health care provider. This will help with discomfort.  Use nasal saline washes as often as told by your health care provider.  Wash your hands often with soap and water to reduce your exposure to germs. If soap and water are not available, use hand .  Do not smoke. Avoid being around people who are smoking (secondhand smoke).  Keep all follow-up visits. This is important.  Contact a health care provider if:  You have a fever.  Your symptoms get worse.  Your symptoms do not improve within 10 days.  Get help right away if:  You have a severe headache.  You have persistent vomiting.  You have severe pain or swelling around your face or eyes.  You have vision problems.  You develop confusion.  Your neck is stiff.  You have trouble breathing.  These symptoms may be an emergency. Get help right away. Call 911.  Do not wait to see if the symptoms will go away.  Do not drive yourself to the hospital.  Summary  A sinus infection is soreness and inflammation of your sinuses. Sinuses are hollow spaces in the bones around your face.  This condition is caused by nasal tissues that become inflamed or swollen. The swelling traps or blocks the flow of mucus. This allows bacteria, viruses, and fungi to grow, which leads to infection.  If you were prescribed an antibiotic medicine, take it as told by your health care provider. Do not stop taking the antibiotic even if you start to feel better.  Keep all follow-up visits. This is important.  This information is not intended to replace advice given to you by your health care provider. Make sure you discuss any questions you have with your health  care provider.  Document Revised: 11/22/2022 Document Reviewed: 11/22/2022  [employer] Patient Education © 2024 [employer]Acute Bronchitis, Adult    Acute bronchitis is sudden inflammation of the main airways (bronchi) that come off the windpipe (trachea) in the lungs. The swelling causes the airways to get smaller and make more mucus than normal. This can make it hard to breathe and can cause coughing or noisy breathing (wheezing).  Acute bronchitis may last several weeks. The cough may last longer. Allergies, asthma, and exposure to smoke may make the condition worse.  What are the causes?  This condition can be caused by germs and by substances that irritate the lungs, including:  Cold and flu viruses. The most common cause of this condition is the virus that causes the common cold.  Bacteria. This is less common.  Breathing in substances that irritate the lungs, including:  Smoke from cigarettes and other forms of tobacco.  Dust and pollen.  Fumes from household cleaning products, gases, or burned fuel.  Indoor or outdoor air pollution.  What increases the risk?  The following factors may make you more likely to develop this condition:  A weak body's defense system, also called the immune system.  A condition that affects your lungs and breathing, such as asthma.  What are the signs or symptoms?  Common symptoms of this condition include:  Coughing. This may bring up clear, yellow, or green mucus from your lungs (sputum).  Wheezing.  Runny or stuffy nose.  Having too much mucus in your lungs (chest congestion).  Shortness of breath.  Aches and pains, including sore throat or chest.  How is this diagnosed?  This condition is usually diagnosed based on:  Your symptoms and medical history.  A physical exam.  You may also have other tests, including tests to rule out other conditions, such as pneumonia. These tests include:  A test of lung function.  Test of a mucus sample to look for the presence of  bacteria.  Tests to check the oxygen level in your blood.  Blood tests.  Chest X-ray.  How is this treated?  Most cases of acute bronchitis clear up over time without treatment. Your health care provider may recommend:  Drinking more fluids to help thin your mucus so it is easier to cough up.  Taking inhaled medicine (inhaler) to improve air flow in and out of your lungs.  Using a vaporizer or a humidifier. These are machines that add water to the air to help you breathe better.  Taking a medicine that thins mucus and clears congestion (expectorant).  Taking a medicine that prevents or stops coughing (cough suppressant).  It is not common to take an antibiotic medicine for this condition.  Follow these instructions at home:    Take over-the-counter and prescription medicines only as told by your health care provider.  Use an inhaler, vaporizer, or humidifier as told by your health care provider.  Take two teaspoons (10 mL) of honey at bedtime to lessen coughing at night.  Drink enough fluid to keep your urine pale yellow.  Do not use any products that contain nicotine or tobacco. These products include cigarettes, chewing tobacco, and vaping devices, such as e-cigarettes. If you need help quitting, ask your health care provider.  Get plenty of rest.  Return to your normal activities as told by your health care provider. Ask your health care provider what activities are safe for you.  Keep all follow-up visits. This is important.  How is this prevented?  To lower your risk of getting this condition again:  Wash your hands often with soap and water for at least 20 seconds. If soap and water are not available, use hand .  Avoid contact with people who have cold symptoms.  Try not to touch your mouth, nose, or eyes with your hands.  Avoid breathing in smoke or chemical fumes. Breathing smoke or chemical fumes will make your condition worse.  Get the flu shot every year.  Contact a health care provider if:  Your  symptoms do not improve after 2 weeks.  You have trouble coughing up the mucus.  Your cough keeps you awake at night.  You have a fever.  Get help right away if you:  Cough up blood.  Feel pain in your chest.  Have severe shortness of breath.  Faint or keep feeling like you are going to faint.  Have a severe headache.  Have a fever or chills that get worse.  These symptoms may represent a serious problem that is an emergency. Do not wait to see if the symptoms will go away. Get medical help right away. Call your local emergency services (911 in the [place] Do not drive yourself to the hospital.  Summary  Acute bronchitis is inflammation of the main airways (bronchi) that come off the windpipe (trachea) in the lungs. The swelling causes the airways to get smaller and make more mucus than normal.  Drinking more fluids can help thin your mucus so it is easier to cough up.  Take over-the-counter and prescription medicines only as told by your health care provider.  Do not use any products that contain nicotine or tobacco. These products include cigarettes, chewing tobacco, and vaping devices, such as e-cigarettes. If you need help quitting, ask your health care provider.  Contact a health care provider if your symptoms do not improve after 2 weeks.  This information is not intended to replace advice given to you by your health care provider. Make sure you discuss any questions you have with your health care provider.  Document Revised: 03/30/2023 Document Reviewed: 04/20/2022  Elsevier Patient Education © 2024 [employer] Inc.

## 2025-02-07 NOTE — PROGRESS NOTES
You have chosen to receive care through a telehealth visit.  Do you consent to use a video/audio connection for your medical care today? Yes     Patient or patient representative verbalized consent for the use of Ambient Listening during the visit with  IONA Carcamo for chart documentation. 2025  16:35 EST    CHIEF COMPLAINT  No chief complaint on file.        HPI  History of Present Illness  The patient is a 34-year-old female presenting with complaints of influenza-like symptoms.    She reports that her symptoms began approximately 2 weeks ago, initially appearing to resolve after a few days but subsequently recurring. She describes experiencing generalized body aches, severe cough, and congestion. The severity of her symptoms fluctuates, with periods of relative improvement followed by exacerbations. She also reports the production of thick, dark mucus upon nasal expectoration. Additionally, she experiences wheezing and shortness of breath, accompanied by significant pain during respiration. She has been managing intermittent low-grade fevers with Tylenol. She is not currently breastfeeding.    ALLERGIES  The patient has no known allergies.    MEDICATIONS  Current: Tylenol       Review of Systems  See HPI    Past Medical History:   Diagnosis Date    GERD (gastroesophageal reflux disease)     Hepatitis C     History of transfusion     Hyperlipidemia     Lupus     Spontaneous      Substance abuse     Urinary tract infection     Vaginal bleeding        Family History   Problem Relation Age of Onset    Diabetes Mother     Congenital heart disease Mother        Social History     Socioeconomic History    Marital status:    Tobacco Use    Smoking status: Former     Current packs/day: 2.00     Average packs/day: 2.0 packs/day for 20.0 years (40.0 ttl pk-yrs)     Types: Cigarettes    Smokeless tobacco: Never   Vaping Use    Vaping status: Every Day    Substances: Nicotine, Flavoring   Substance  and Sexual Activity    Alcohol use: No    Drug use: No    Sexual activity: Not Currently     Partners: Male     Birth control/protection: None       Gisel Mena  reports that she has quit smoking. Her smoking use included cigarettes. She has a 40 pack-year smoking history. She has never used smokeless tobacco.             Breastfeeding No     PHYSICAL EXAM  Physical Exam   Constitutional: She is oriented to person, place, and time. She appears well-developed and well-nourished. She does not have a sickly appearance. She does not appear ill.   HENT:   Head: Normocephalic and atraumatic.   Pulmonary/Chest: Effort normal.  No respiratory distress (persistent cough during visit).  Neurological: She is alert and oriented to person, place, and time.           Diagnoses and all orders for this visit:    1. Acute pansinusitis, recurrence not specified (Primary)  -     amoxicillin-clavulanate (AUGMENTIN) 875-125 MG per tablet; Take 1 tablet by mouth 2 (Two) Times a Day for 7 days.  Dispense: 14 tablet; Refill: 0    2. Bronchitis  -     predniSONE (DELTASONE) 10 MG (21) dose pack; Use as directed on package  Dispense: 21 tablet; Refill: 0  -     benzonatate (TESSALON) 200 MG capsule; Take 1 capsule by mouth 3 (Three) Times a Day As Needed for Cough.  Dispense: 21 capsule; Refill: 0  -     albuterol sulfate  (90 Base) MCG/ACT inhaler; Inhale 2 puffs Every 4 (Four) Hours As Needed for Wheezing or Shortness of Air (cough).  Dispense: 18 g; Refill: 0    --take medications as prescribed  --increase fluids, rest as needed, tylenol or ibuprofen for pain  --f/u in 3-5 days if no improvement      Assessment & Plan  1. Suspected sinus infection.  Her symptoms have persisted for several weeks, which is inconsistent with a typical influenza course. The presence of thick, dark mucus, facial tenderness, and body aches suggests a sinus infection. A prescription for Augmentin 875 mg, to be taken twice daily for 7 days, has  been provided. Additionally, a prednisone pack has been prescribed, to be taken as per the package instructions. Cough suppressant tablets have been prescribed, to be taken every 8 hours as needed. An albuterol inhaler has also been prescribed, with instructions to administer 2 puffs every 4 hours as needed for wheezing, shortness of breath, or cough. All prescriptions will be sent to [facility] in [place] on [address] If there is no improvement in her condition within 3 to 5 days, she is advised to seek immediate medical attention at an urgent care facility.         FOLLOW-UP  As discussed during visit with PCP/[facility] if no improvement or Urgent Care/Emergency Department if worsening of symptoms    Patient verbalizes understanding of medication dosage, comfort measures, instructions for treatment and follow-up.    Edwina Hernandez, APRN  02/07/2025  16:35 EST    Mode of Visit: Video  Location of patient: -HOME-  Location of provider: +HOME+  You have chosen to receive care through a telehealth visit.  The patient has signed the video visit consent form.  The visit included audio and video interaction. No technical issues occurred during this visit.    The use of a video visit has been reviewed with the patient and verbal informed consent has been obtained. Myself and Gisel Mena     participated in this visit. The patient is located in [address] [place] [address]  I am located in [place], [place]. [employer] and [employer] were utilized. I spent 8 minutes in the patient's chart for this visit.      Note Disclaimer: At [facility], we believe that sharing information builds trust and better   relationships. You are receiving this note because you recently visited [facility]. It is possible you   will see health information before a provider has talked with you about it. This kind of information can   be easy to misunderstand. To help you fully understand what it means for your health, we  urge you to   discuss this note with your provider.

## 2025-02-10 ENCOUNTER — HOSPITAL ENCOUNTER (EMERGENCY)
Facility: HOSPITAL | Age: 35
Discharge: PSYCHIATRIC OR SUBSTANCE USE FACILITY | End: 2025-02-11
Payer: COMMERCIAL

## 2025-02-10 DIAGNOSIS — F15.20 METHAMPHETAMINE DEPENDENCE: ICD-10-CM

## 2025-02-10 DIAGNOSIS — R44.3 HALLUCINATIONS: Primary | ICD-10-CM

## 2025-02-10 DIAGNOSIS — F41.9 ANXIETY: ICD-10-CM

## 2025-02-10 LAB
ALBUMIN SERPL-MCNC: 4.5 G/DL (ref 3.5–5.2)
ALBUMIN/GLOB SERPL: 1.3 G/DL
ALP SERPL-CCNC: 72 U/L (ref 39–117)
ALT SERPL W P-5'-P-CCNC: 14 U/L (ref 1–33)
ANION GAP SERPL CALCULATED.3IONS-SCNC: 10 MMOL/L (ref 5–15)
AST SERPL-CCNC: 15 U/L (ref 1–32)
BASOPHILS # BLD AUTO: 0.03 10*3/MM3 (ref 0–0.2)
BASOPHILS NFR BLD AUTO: 0.4 % (ref 0–1.5)
BILIRUB SERPL-MCNC: 0.4 MG/DL (ref 0–1.2)
BUN SERPL-MCNC: 13 MG/DL (ref 6–20)
BUN/CREAT SERPL: 14.6 (ref 7–25)
CALCIUM SPEC-SCNC: 9.5 MG/DL (ref 8.6–10.5)
CHLORIDE SERPL-SCNC: 104 MMOL/L (ref 98–107)
CO2 SERPL-SCNC: 25 MMOL/L (ref 22–29)
CREAT SERPL-MCNC: 0.89 MG/DL (ref 0.57–1)
CRP SERPL-MCNC: <0.3 MG/DL (ref 0–0.5)
DEPRECATED RDW RBC AUTO: 43.5 FL (ref 37–54)
EGFRCR SERPLBLD CKD-EPI 2021: 87.4 ML/MIN/1.73
EOSINOPHIL # BLD AUTO: 0 10*3/MM3 (ref 0–0.4)
EOSINOPHIL NFR BLD AUTO: 0 % (ref 0.3–6.2)
ERYTHROCYTE [DISTWIDTH] IN BLOOD BY AUTOMATED COUNT: 15.5 % (ref 12.3–15.4)
ERYTHROCYTE [SEDIMENTATION RATE] IN BLOOD: 17 MM/HR (ref 0–20)
GEN 5 1HR TROPONIN T REFLEX: <6 NG/L
GLOBULIN UR ELPH-MCNC: 3.4 GM/DL
GLUCOSE SERPL-MCNC: 98 MG/DL (ref 65–99)
HCG SERPL QL: NEGATIVE
HCT VFR BLD AUTO: 36.1 % (ref 34–46.6)
HGB BLD-MCNC: 10.8 G/DL (ref 12–15.9)
HOLD SPECIMEN: NORMAL
IMM GRANULOCYTES # BLD AUTO: 0.02 10*3/MM3 (ref 0–0.05)
IMM GRANULOCYTES NFR BLD AUTO: 0.3 % (ref 0–0.5)
LYMPHOCYTES # BLD AUTO: 2.01 10*3/MM3 (ref 0.7–3.1)
LYMPHOCYTES NFR BLD AUTO: 28.6 % (ref 19.6–45.3)
MAGNESIUM SERPL-MCNC: 2 MG/DL (ref 1.6–2.6)
MCH RBC QN AUTO: 23.2 PG (ref 26.6–33)
MCHC RBC AUTO-ENTMCNC: 29.9 G/DL (ref 31.5–35.7)
MCV RBC AUTO: 77.6 FL (ref 79–97)
MONOCYTES # BLD AUTO: 0.42 10*3/MM3 (ref 0.1–0.9)
MONOCYTES NFR BLD AUTO: 6 % (ref 5–12)
NEUTROPHILS NFR BLD AUTO: 4.56 10*3/MM3 (ref 1.7–7)
NEUTROPHILS NFR BLD AUTO: 64.7 % (ref 42.7–76)
NRBC BLD AUTO-RTO: 0 /100 WBC (ref 0–0.2)
PLATELET # BLD AUTO: 324 10*3/MM3 (ref 140–450)
PMV BLD AUTO: 9.2 FL (ref 6–12)
POTASSIUM SERPL-SCNC: 4.2 MMOL/L (ref 3.5–5.2)
PROT SERPL-MCNC: 7.9 G/DL (ref 6–8.5)
RBC # BLD AUTO: 4.65 10*6/MM3 (ref 3.77–5.28)
SODIUM SERPL-SCNC: 139 MMOL/L (ref 136–145)
TROPONIN T NUMERIC DELTA: NORMAL
TROPONIN T SERPL HS-MCNC: <6 NG/L
WBC NRBC COR # BLD AUTO: 7.04 10*3/MM3 (ref 3.4–10.8)
WHOLE BLOOD HOLD COAG: NORMAL
WHOLE BLOOD HOLD SPECIMEN: NORMAL

## 2025-02-10 PROCEDURE — 80179 DRUG ASSAY SALICYLATE: CPT

## 2025-02-10 PROCEDURE — 93005 ELECTROCARDIOGRAM TRACING: CPT

## 2025-02-10 PROCEDURE — 36415 COLL VENOUS BLD VENIPUNCTURE: CPT

## 2025-02-10 PROCEDURE — 83735 ASSAY OF MAGNESIUM: CPT

## 2025-02-10 PROCEDURE — 84484 ASSAY OF TROPONIN QUANT: CPT

## 2025-02-10 PROCEDURE — 85652 RBC SED RATE AUTOMATED: CPT

## 2025-02-10 PROCEDURE — 80143 DRUG ASSAY ACETAMINOPHEN: CPT

## 2025-02-10 PROCEDURE — 82077 ASSAY SPEC XCP UR&BREATH IA: CPT

## 2025-02-10 PROCEDURE — 80050 GENERAL HEALTH PANEL: CPT

## 2025-02-10 PROCEDURE — 84703 CHORIONIC GONADOTROPIN ASSAY: CPT

## 2025-02-10 PROCEDURE — 86140 C-REACTIVE PROTEIN: CPT

## 2025-02-10 PROCEDURE — 99285 EMERGENCY DEPT VISIT HI MDM: CPT

## 2025-02-10 RX ORDER — LORAZEPAM 1 MG/1
1 TABLET ORAL ONCE
Status: COMPLETED | OUTPATIENT
Start: 2025-02-11 | End: 2025-02-11

## 2025-02-11 ENCOUNTER — APPOINTMENT (OUTPATIENT)
Dept: CT IMAGING | Facility: HOSPITAL | Age: 35
End: 2025-02-11
Payer: COMMERCIAL

## 2025-02-11 VITALS
TEMPERATURE: 98.1 F | SYSTOLIC BLOOD PRESSURE: 133 MMHG | OXYGEN SATURATION: 100 % | DIASTOLIC BLOOD PRESSURE: 94 MMHG | RESPIRATION RATE: 16 BRPM | HEART RATE: 77 BPM | BODY MASS INDEX: 27.28 KG/M2 | HEIGHT: 68 IN | WEIGHT: 180 LBS

## 2025-02-11 LAB
AMPHET+METHAMPHET UR QL: POSITIVE
AMPHETAMINES UR QL: POSITIVE
APAP SERPL-MCNC: <5 MCG/ML (ref 0–30)
BARBITURATES UR QL SCN: NEGATIVE
BENZODIAZ UR QL SCN: NEGATIVE
BUPRENORPHINE SERPL-MCNC: NEGATIVE NG/ML
CANNABINOIDS SERPL QL: NEGATIVE
COCAINE UR QL: NEGATIVE
ETHANOL BLD-MCNC: <10 MG/DL (ref 0–10)
ETHANOL UR QL: <0.01 %
FENTANYL UR-MCNC: NEGATIVE NG/ML
METHADONE UR QL SCN: NEGATIVE
OPIATES UR QL: NEGATIVE
OXYCODONE UR QL SCN: NEGATIVE
PCP UR QL SCN: NEGATIVE
QT INTERVAL: 340 MS
QTC INTERVAL: 443 MS
SALICYLATES SERPL-MCNC: <0.3 MG/DL
TRICYCLICS UR QL SCN: NEGATIVE
TSH SERPL DL<=0.05 MIU/L-ACNC: 1.26 UIU/ML (ref 0.27–4.2)

## 2025-02-11 PROCEDURE — 25510000001 IOPAMIDOL PER 1 ML

## 2025-02-11 PROCEDURE — 71275 CT ANGIOGRAPHY CHEST: CPT | Performed by: RADIOLOGY

## 2025-02-11 PROCEDURE — 70450 CT HEAD/BRAIN W/O DYE: CPT

## 2025-02-11 PROCEDURE — 71275 CT ANGIOGRAPHY CHEST: CPT

## 2025-02-11 PROCEDURE — 70450 CT HEAD/BRAIN W/O DYE: CPT | Performed by: RADIOLOGY

## 2025-02-11 PROCEDURE — 80307 DRUG TEST PRSMV CHEM ANLYZR: CPT

## 2025-02-11 RX ORDER — IOPAMIDOL 755 MG/ML
100 INJECTION, SOLUTION INTRAVASCULAR
Status: COMPLETED | OUTPATIENT
Start: 2025-02-11 | End: 2025-02-11

## 2025-02-11 RX ORDER — AMLODIPINE BESYLATE 5 MG/1
5 TABLET ORAL ONCE
Status: COMPLETED | OUTPATIENT
Start: 2025-02-11 | End: 2025-02-11

## 2025-02-11 RX ADMIN — AMLODIPINE BESYLATE 5 MG: 5 TABLET ORAL at 02:13

## 2025-02-11 RX ADMIN — LORAZEPAM 1 MG: 1 TABLET ORAL at 00:18

## 2025-02-11 RX ADMIN — IOPAMIDOL 80 ML: 755 INJECTION, SOLUTION INTRAVENOUS at 01:57

## 2025-02-11 NOTE — NURSING NOTE
Spoke with Dr. Jordan about plan of care for patient and Dr. Jordan advises to give patient rehab options and outpatient psychiatric resources as long as there are no safety concerns. I advised Dr. Jordan that patient has not heard any command hallucinations and patient denies SI, HI and visual disturbances. I have advised the ED provider of Dr. Jordan's recommendation as well.

## 2025-02-11 NOTE — NURSING NOTE
"Physician requested psych consult due to patient having anxiety and auditory hallucinations. Physician statement \"Of particular concern the patient is having auditory hallucinations which are very negative towards her and significantly affecting her mental wellbeing\"    Patient states that she's been hearing things and can't tell if they're real or not. She states \"I have fears of hearing things that aren't there. I could hear voices that told me my  was cheating and they were making fun of me\". Patient also states that she has lupus and she thinks that may be why she's hearing things\". Patient states that the voices that she hears have never told her to harm or kill herself\". Patient denies SI, HI and visual hallucinations.     Patient rates both depression and anxiety a 7/10. Patient states that she's not really stressed about anything right now.     Patient denies current alcohol or drug use but says that there may be meth in her system.      "

## 2025-02-11 NOTE — ED PROVIDER NOTES
"Subjective   History of Present Illness  Patient presents today with multiple systems symptoms.  The patient describes that she has lost energy and feels weak.  Patient states that particular when she is try to open jars and do other similar manual and dexterous activities she feels that she is incapable of doing it as she would normally.  The patient states that she has shortness of breath with minimal exertion but otherwise denies any other significant cardiorespiratory symptoms.  The patient states that she gets intermittent urinary urgency and there could be a strong smell or color to it.  Otherwise the patient denies any other GI or  symptoms on ROS.  Patient states that she is getting very concerning auditory hallucinations which are very negative about her.  Patient understands that these are not real having now performed some investigation herself but this is very distressing to her because of the negativity as well as the fact that she feels she is going \"crazy\".  The patient's stresses have therefore increased and she is feeling extremely anxious at this time.  The patient denies any suicidality or homicidal intentions at this time.      Review of Systems   Constitutional:  Positive for activity change. Negative for chills, diaphoresis and fever.   HENT: Negative.     Eyes: Negative.    Respiratory:  Positive for shortness of breath. Negative for cough, wheezing and stridor.    Cardiovascular:  Negative for chest pain and palpitations.   Gastrointestinal:  Negative for abdominal distention, abdominal pain, constipation, diarrhea, nausea and vomiting.   Genitourinary: Negative.    Skin: Negative.    Neurological: Negative.    Psychiatric/Behavioral:  Positive for agitation, dysphoric mood, hallucinations and sleep disturbance. Negative for behavioral problems, confusion, decreased concentration, self-injury and suicidal ideas. The patient is nervous/anxious.        Past Medical History:   Diagnosis Date "    GERD (gastroesophageal reflux disease)     Hepatitis C     History of transfusion     Hyperlipidemia     Lupus     Spontaneous      Substance abuse     Urinary tract infection     Vaginal bleeding        No Known Allergies    Past Surgical History:   Procedure Laterality Date    ADENOIDECTOMY      APPENDECTOMY      CHOLECYSTECTOMY      D & C WITH SUCTION N/A 12/15/2023    Procedure: DILATATION AND CURETTAGE WITH SUCTION;  Surgeon: Ana Thorpe DO;  Location: [facility];  Service: Obstetrics/Gynecology;  Laterality: N/A;    TONSILLECTOMY         Family History   Problem Relation Age of Onset    Diabetes Mother     Congenital heart disease Mother        Social History     Socioeconomic History    Marital status:     Years of education: 12    Highest education level: High school graduate   Tobacco Use    Smoking status: Former     Current packs/day: 2.00     Average packs/day: 2.0 packs/day for 20.0 years (40.0 ttl pk-yrs)     Types: Cigarettes    Smokeless tobacco: Never   Vaping Use    Vaping status: Every Day    Substances: Nicotine, Flavoring   Substance and Sexual Activity    Alcohol use: No    Drug use: No    Sexual activity: Not Currently     Partners: Male     Birth control/protection: None           Objective   Physical Exam  Vitals and nursing note reviewed.   Constitutional:       General: She is awake. She is not in acute distress.     Appearance: She is overweight. She is not ill-appearing, toxic-appearing or diaphoretic.   HENT:      Head: Normocephalic and atraumatic.      Mouth/Throat:      Pharynx: Oropharynx is clear. No oropharyngeal exudate or posterior oropharyngeal erythema.   Eyes:      General:         Right eye: No discharge.         Left eye: No discharge.      Extraocular Movements: Extraocular movements intact.      Conjunctiva/sclera: Conjunctivae normal.      Pupils: Pupils are equal, round, and reactive to light.   Cardiovascular:      Rate and Rhythm: Normal rate  and regular rhythm.      Heart sounds: Normal heart sounds. No murmur heard.     No friction rub. No gallop.   Pulmonary:      Effort: Pulmonary effort is normal. No respiratory distress.      Breath sounds: Normal breath sounds. No stridor.   Abdominal:      General: Abdomen is flat. There is no distension.      Palpations: Abdomen is soft. There is no mass.      Tenderness: There is no abdominal tenderness.      Hernia: No hernia is present.   Musculoskeletal:         General: No swelling, tenderness, deformity or signs of injury.   Skin:     Coloration: Skin is not jaundiced.      Findings: No bruising, erythema or rash.   Neurological:      General: No focal deficit present.      Mental Status: She is alert and oriented to person, place, and time. Mental status is at baseline.      Cranial Nerves: No cranial nerve deficit.      Sensory: No sensory deficit.      Motor: No weakness.   Psychiatric:         Behavior: Behavior normal. Behavior is cooperative.         Thought Content: Thought content normal.         Judgment: Judgment normal.         Procedures           ED Course  ED Course as of 02/11/25 0518   Mon Feb 10, 2025   2150 EKG interpretation sinus tachycardia 102 bpm QTc is 440 no ST elevations or depressions.  Electronically signed by Jovan Mirza DO, 02/10/25, 9:51 PM EST.   [GF]   2349 Hemoglobin(!): 10.8 [RA]   2349 Comprehensive Metabolic Panel [RA]   2349 BP(!): 181/99  I have instructed the RN to obtain a second blood pressure [RA]   e Feb 11, 2025   0104 Methamphetamine, Ur(!): Positive [RA]   0104 Amphetamine, Urine Qual(!): Positive [RA]   0403 CT Angiogram Chest Pulmonary Embolism [RA]   0403 CT Head Without Contrast [RA]      ED Course User Index  [GF] Jovan Mirza DO  [RA] Iam Vargas MD                                                       Medical Decision Making  Patient is presenting with multiple concerns today.  Of particular concern the patient is having  auditory hallucinations which are very negative towards her and significantly affecting her mental wellbeing.  Patient is also feeling generally weak and having cardiorespiratory symptoms which are somewhat nonspecific but these are also contributing to her anxiety.    Patient has had appropriate lab work and imaging.      Problems Addressed:  Anxiety: complicated acute illness or injury  Hallucinations: complicated acute illness or injury  Methamphetamine dependence: complicated acute illness or injury    Amount and/or Complexity of Data Reviewed  Labs: ordered. Decision-making details documented in ED Course.  Radiology: ordered. Decision-making details documented in ED Course.  ECG/medicine tests: ordered.    Risk  Prescription drug management.        Final diagnoses:   Anxiety   Hallucinations   Methamphetamine dependence       ED Disposition  ED Disposition       ED Disposition   DC/Transfer to Behavioral Health Condition   Stable    Comment   --               No follow-up provider specified.       Medication List      No changes were made to your prescriptions during this visit.            Iam Vargas MD  02/11/25 5139       Iam Vargas MD  02/11/25 4002

## 2025-04-13 ENCOUNTER — HOSPITAL ENCOUNTER (EMERGENCY)
Facility: HOSPITAL | Age: 35
Discharge: HOME | End: 2025-04-13
Attending: STUDENT IN AN ORGANIZED HEALTH CARE EDUCATION/TRAINING PROGRAM | Admitting: STUDENT IN AN ORGANIZED HEALTH CARE EDUCATION/TRAINING PROGRAM
Payer: COMMERCIAL

## 2025-04-13 ENCOUNTER — APPOINTMENT (OUTPATIENT)
Dept: CT IMAGING | Facility: HOSPITAL | Age: 35
End: 2025-04-13
Payer: COMMERCIAL

## 2025-04-13 VITALS
OXYGEN SATURATION: 100 % | SYSTOLIC BLOOD PRESSURE: 130 MMHG | TEMPERATURE: 97.9 F | DIASTOLIC BLOOD PRESSURE: 93 MMHG | HEART RATE: 84 BPM | WEIGHT: 170 LBS | BODY MASS INDEX: 25.76 KG/M2 | RESPIRATION RATE: 14 BRPM | HEIGHT: 68 IN

## 2025-04-13 DIAGNOSIS — Z91.199 MEDICAL NON-COMPLIANCE: ICD-10-CM

## 2025-04-13 DIAGNOSIS — F15.10 METHAMPHETAMINE USE: ICD-10-CM

## 2025-04-13 DIAGNOSIS — I10 PRIMARY HYPERTENSION: Primary | ICD-10-CM

## 2025-04-13 LAB
ALBUMIN SERPL-MCNC: 4.1 G/DL (ref 3.5–5.2)
ALBUMIN/GLOB SERPL: 1.2 G/DL
ALP SERPL-CCNC: 69 U/L (ref 39–117)
ALT SERPL W P-5'-P-CCNC: 11 U/L (ref 1–33)
AMPHET+METHAMPHET UR QL: POSITIVE
AMPHETAMINES UR QL: POSITIVE
ANION GAP SERPL CALCULATED.3IONS-SCNC: 7.3 MMOL/L (ref 5–15)
AST SERPL-CCNC: 14 U/L (ref 1–32)
BACTERIA UR QL AUTO: ABNORMAL /HPF
BARBITURATES UR QL SCN: NEGATIVE
BASOPHILS # BLD AUTO: 0.06 10*3/MM3 (ref 0–0.2)
BASOPHILS NFR BLD AUTO: 1.1 % (ref 0–1.5)
BENZODIAZ UR QL SCN: NEGATIVE
BILIRUB SERPL-MCNC: 0.4 MG/DL (ref 0–1.2)
BILIRUB UR QL STRIP: NEGATIVE
BUN SERPL-MCNC: 9 MG/DL (ref 6–20)
BUN/CREAT SERPL: 12.7 (ref 7–25)
BUPRENORPHINE SERPL-MCNC: NEGATIVE NG/ML
CALCIUM SPEC-SCNC: 9.1 MG/DL (ref 8.6–10.5)
CANNABINOIDS SERPL QL: POSITIVE
CHLORIDE SERPL-SCNC: 107 MMOL/L (ref 98–107)
CLARITY UR: CLEAR
CO2 SERPL-SCNC: 24.7 MMOL/L (ref 22–29)
COCAINE UR QL: NEGATIVE
COLOR UR: YELLOW
CREAT SERPL-MCNC: 0.71 MG/DL (ref 0.57–1)
CRP SERPL-MCNC: <0.3 MG/DL (ref 0–0.5)
D-LACTATE SERPL-SCNC: 1.2 MMOL/L (ref 0.5–2)
DEPRECATED RDW RBC AUTO: 47.2 FL (ref 37–54)
EGFRCR SERPLBLD CKD-EPI 2021: 113.9 ML/MIN/1.73
EOSINOPHIL # BLD AUTO: 0.13 10*3/MM3 (ref 0–0.4)
EOSINOPHIL NFR BLD AUTO: 2.3 % (ref 0.3–6.2)
ERYTHROCYTE [DISTWIDTH] IN BLOOD BY AUTOMATED COUNT: 15.9 % (ref 12.3–15.4)
ETHANOL BLD-MCNC: <10 MG/DL (ref 0–10)
ETHANOL UR QL: <0.01 %
FENTANYL UR-MCNC: NEGATIVE NG/ML
GLOBULIN UR ELPH-MCNC: 3.4 GM/DL
GLUCOSE SERPL-MCNC: 107 MG/DL (ref 65–99)
GLUCOSE UR STRIP-MCNC: NEGATIVE MG/DL
HCG SERPL QL: NEGATIVE
HCT VFR BLD AUTO: 36.2 % (ref 34–46.6)
HGB BLD-MCNC: 10.5 G/DL (ref 12–15.9)
HGB UR QL STRIP.AUTO: NEGATIVE
HOLD SPECIMEN: NORMAL
HYALINE CASTS UR QL AUTO: ABNORMAL /LPF
IMM GRANULOCYTES # BLD AUTO: 0.01 10*3/MM3 (ref 0–0.05)
IMM GRANULOCYTES NFR BLD AUTO: 0.2 % (ref 0–0.5)
KETONES UR QL STRIP: NEGATIVE
LEUKOCYTE ESTERASE UR QL STRIP.AUTO: NEGATIVE
LYMPHOCYTES # BLD AUTO: 1.49 10*3/MM3 (ref 0.7–3.1)
LYMPHOCYTES NFR BLD AUTO: 26.7 % (ref 19.6–45.3)
MCH RBC QN AUTO: 23.5 PG (ref 26.6–33)
MCHC RBC AUTO-ENTMCNC: 29 G/DL (ref 31.5–35.7)
MCV RBC AUTO: 81.2 FL (ref 79–97)
METHADONE UR QL SCN: NEGATIVE
MONOCYTES # BLD AUTO: 0.51 10*3/MM3 (ref 0.1–0.9)
MONOCYTES NFR BLD AUTO: 9.1 % (ref 5–12)
NEUTROPHILS NFR BLD AUTO: 3.39 10*3/MM3 (ref 1.7–7)
NEUTROPHILS NFR BLD AUTO: 60.6 % (ref 42.7–76)
NITRITE UR QL STRIP: NEGATIVE
NRBC BLD AUTO-RTO: 0 /100 WBC (ref 0–0.2)
OPIATES UR QL: NEGATIVE
OXYCODONE UR QL SCN: NEGATIVE
PCP UR QL SCN: NEGATIVE
PH UR STRIP.AUTO: 7 [PH] (ref 5–8)
PLATELET # BLD AUTO: 360 10*3/MM3 (ref 140–450)
PMV BLD AUTO: 9.8 FL (ref 6–12)
POTASSIUM SERPL-SCNC: 4.1 MMOL/L (ref 3.5–5.2)
PROCALCITONIN SERPL-MCNC: 0.02 NG/ML (ref 0–0.25)
PROT SERPL-MCNC: 7.5 G/DL (ref 6–8.5)
PROT UR QL STRIP: ABNORMAL
RBC # BLD AUTO: 4.46 10*6/MM3 (ref 3.77–5.28)
RBC # UR STRIP: ABNORMAL /HPF
REF LAB TEST METHOD: ABNORMAL
SODIUM SERPL-SCNC: 139 MMOL/L (ref 136–145)
SP GR UR STRIP: >=1.03 (ref 1–1.03)
SQUAMOUS #/AREA URNS HPF: ABNORMAL /HPF
TRANS CELLS #/AREA URNS HPF: ABNORMAL /HPF
TRICYCLICS UR QL SCN: NEGATIVE
TSH SERPL DL<=0.05 MIU/L-ACNC: 1.45 UIU/ML (ref 0.27–4.2)
UROBILINOGEN UR QL STRIP: ABNORMAL
WBC # UR STRIP: ABNORMAL /HPF
WBC NRBC COR # BLD AUTO: 5.59 10*3/MM3 (ref 3.4–10.8)
WHOLE BLOOD HOLD COAG: NORMAL
WHOLE BLOOD HOLD SPECIMEN: NORMAL

## 2025-04-13 PROCEDURE — 80050 GENERAL HEALTH PANEL: CPT

## 2025-04-13 PROCEDURE — 99284 EMERGENCY DEPT VISIT MOD MDM: CPT

## 2025-04-13 PROCEDURE — 81001 URINALYSIS AUTO W/SCOPE: CPT

## 2025-04-13 PROCEDURE — 80307 DRUG TEST PRSMV CHEM ANLYZR: CPT

## 2025-04-13 PROCEDURE — 83605 ASSAY OF LACTIC ACID: CPT

## 2025-04-13 PROCEDURE — 70450 CT HEAD/BRAIN W/O DYE: CPT | Performed by: RADIOLOGY

## 2025-04-13 PROCEDURE — 82077 ASSAY SPEC XCP UR&BREATH IA: CPT

## 2025-04-13 PROCEDURE — 84145 PROCALCITONIN (PCT): CPT

## 2025-04-13 PROCEDURE — 86140 C-REACTIVE PROTEIN: CPT

## 2025-04-13 PROCEDURE — 36415 COLL VENOUS BLD VENIPUNCTURE: CPT

## 2025-04-13 PROCEDURE — 84703 CHORIONIC GONADOTROPIN ASSAY: CPT

## 2025-04-13 PROCEDURE — 70450 CT HEAD/BRAIN W/O DYE: CPT

## 2025-04-13 NOTE — ED PROVIDER NOTES
"Subjective   History of Present Illness  35-year-old female patient with known past medical history significant for lupus, hepatitis C, anemia, history of substance abuse presents to [facility] ED with chief complaint of headache, elevated BP, and nosebleed.  Patient states that her nose was bleeding this morning when she woke up, although this has resolved at this time.  She states that headaches have been frequent and have been worse than usual.  She denies any visual disturbance, chest pain, shortness of breath, palpitations, or syncope.  She states that systolic BP was in the 80s last night and she took her home antihypertensive meds.  Patient states that she only takes her BP medications when her blood pressure is high.  She does not take them as prescribed.  Patient also reports recent hematuria which has also improved, although she states that urine remains cloudy.  She denies any significant dysuria/frequency/urgency.  She states overall, that she is \"just not felt good for a while\".    History provided by:  Patient   used: No      Review of Systems   Constitutional:  Positive for fatigue. Negative for chills and fever.        +elevated BP   HENT:  Positive for nosebleeds. Negative for congestion, dental problem, sore throat, trouble swallowing and voice change.    Respiratory: Negative.     Cardiovascular: Negative.  Negative for chest pain.   Gastrointestinal: Negative.  Negative for abdominal pain.   Endocrine: Negative.    Genitourinary:  Positive for hematuria. Negative for dysuria, flank pain, frequency and urgency.   Skin: Negative.    Neurological:  Positive for headaches. Negative for dizziness, tremors, seizures, syncope, facial asymmetry, speech difficulty and numbness.   Psychiatric/Behavioral: Negative.     All other systems reviewed and are negative.      Past Medical History:   Diagnosis Date    GERD (gastroesophageal reflux disease)     Hepatitis C     History of transfusion     " Hyperlipidemia     Lupus     Spontaneous      Substance abuse     Urinary tract infection     Vaginal bleeding        No Known Allergies    Past Surgical History:   Procedure Laterality Date    ADENOIDECTOMY      APPENDECTOMY      CHOLECYSTECTOMY      D & C WITH SUCTION N/A 12/15/2023    Procedure: DILATATION AND CURETTAGE WITH SUCTION;  Surgeon: Ana Thorpe DO;  Location: [facility];  Service: Obstetrics/Gynecology;  Laterality: N/A;    TONSILLECTOMY         Family History   Problem Relation Age of Onset    Diabetes Mother     Congenital heart disease Mother        Social History     Socioeconomic History    Marital status:     Years of education: 12    Highest education level: High school graduate   Tobacco Use    Smoking status: Former     Current packs/day: 2.00     Average packs/day: 2.0 packs/day for 20.0 years (40.0 ttl pk-yrs)     Types: Cigarettes    Smokeless tobacco: Never   Vaping Use    Vaping status: Every Day    Substances: Nicotine, Flavoring   Substance and Sexual Activity    Alcohol use: No    Drug use: No    Sexual activity: Not Currently     Partners: Male     Birth control/protection: None           Objective   Physical Exam  Vitals and nursing note reviewed.   Constitutional:       General: She is not in acute distress.     Appearance: She is well-developed. She is not ill-appearing or diaphoretic.   HENT:      Head: Normocephalic and atraumatic.      Right Ear: External ear normal.      Left Ear: External ear normal.      Nose: Nose normal.   Eyes:      Conjunctiva/sclera: Conjunctivae normal.      Pupils: Pupils are equal, round, and reactive to light.   Neck:      Vascular: No JVD.      Trachea: No tracheal deviation.   Cardiovascular:      Rate and Rhythm: Normal rate and regular rhythm.      Heart sounds: Normal heart sounds. No murmur heard.  Pulmonary:      Effort: Pulmonary effort is normal. No respiratory distress.      Breath sounds: Normal breath sounds. No  wheezing.   Abdominal:      General: Bowel sounds are normal.      Palpations: Abdomen is soft.      Tenderness: There is no abdominal tenderness.   Musculoskeletal:         General: No deformity. Normal range of motion.      Cervical back: Normal range of motion and neck supple.   Skin:     General: Skin is warm and dry.      Coloration: Skin is pale.      Findings: No erythema or rash.   Neurological:      Mental Status: She is alert and oriented to person, place, and time.      Cranial Nerves: No cranial nerve deficit.   Psychiatric:         Behavior: Behavior normal.         Thought Content: Thought content normal.         Procedures           ED Course  ED Course as of 04/13/25 1608   Sun Apr 13, 2025   1519 Awaiting urinalysis. [MC]      ED Course User Index  [MC] June Sheffield APRN      Results for orders placed or performed during the hospital encounter of 04/13/25   Comprehensive Metabolic Panel    Collection Time: 04/13/25  2:15 PM    Specimen: Blood   Result Value Ref Range    Glucose 107 (H) 65 - 99 mg/dL    BUN 9 6 - 20 mg/dL    Creatinine 0.71 0.57 - 1.00 mg/dL    Sodium 139 136 - 145 mmol/L    Potassium 4.1 3.5 - 5.2 mmol/L    Chloride 107 98 - 107 mmol/L    CO2 24.7 22.0 - 29.0 mmol/L    Calcium 9.1 8.6 - 10.5 mg/dL    Total Protein 7.5 6.0 - 8.5 g/dL    Albumin 4.1 3.5 - 5.2 g/dL    ALT (SGPT) 11 1 - 33 U/L    AST (SGOT) 14 1 - 32 U/L    Alkaline Phosphatase 69 39 - 117 U/L    Total Bilirubin 0.4 0.0 - 1.2 mg/dL    Globulin 3.4 gm/dL    A/G Ratio 1.2 g/dL    BUN/Creatinine Ratio 12.7 7.0 - 25.0    Anion Gap 7.3 5.0 - 15.0 mmol/L    eGFR 113.9 >60.0 mL/min/1.73   hCG, Serum, Qualitative    Collection Time: 04/13/25  2:15 PM    Specimen: Blood   Result Value Ref Range    HCG Qualitative Negative Negative   C-reactive Protein    Collection Time: 04/13/25  2:15 PM    Specimen: Blood   Result Value Ref Range    C-Reactive Protein <0.30 0.00 - 0.50 mg/dL   Procalcitonin    Collection Time: 04/13/25   2:15 PM    Specimen: Blood   Result Value Ref Range    Procalcitonin 0.02 0.00 - 0.25 ng/mL   Lactic Acid, Plasma    Collection Time: 04/13/25  2:15 PM    Specimen: Blood   Result Value Ref Range    Lactate 1.2 0.5 - 2.0 mmol/L   TSH    Collection Time: 04/13/25  2:15 PM    Specimen: Blood   Result Value Ref Range    TSH 1.450 0.270 - 4.200 uIU/mL   CBC Auto Differential    Collection Time: 04/13/25  2:15 PM    Specimen: Blood   Result Value Ref Range    WBC 5.59 3.40 - 10.80 10*3/mm3    RBC 4.46 3.77 - 5.28 10*6/mm3    Hemoglobin 10.5 (L) 12.0 - 15.9 g/dL    Hematocrit 36.2 34.0 - 46.6 %    MCV 81.2 79.0 - 97.0 fL    MCH 23.5 (L) 26.6 - 33.0 pg    MCHC 29.0 (L) 31.5 - 35.7 g/dL    RDW 15.9 (H) 12.3 - 15.4 %    RDW-SD 47.2 37.0 - 54.0 fl    MPV 9.8 6.0 - 12.0 fL    Platelets 360 140 - 450 10*3/mm3    Neutrophil % 60.6 42.7 - 76.0 %    Lymphocyte % 26.7 19.6 - 45.3 %    Monocyte % 9.1 5.0 - 12.0 %    Eosinophil % 2.3 0.3 - 6.2 %    Basophil % 1.1 0.0 - 1.5 %    Immature Grans % 0.2 0.0 - 0.5 %    Neutrophils, Absolute 3.39 1.70 - 7.00 10*3/mm3    Lymphocytes, Absolute 1.49 0.70 - 3.10 10*3/mm3    Monocytes, Absolute 0.51 0.10 - 0.90 10*3/mm3    Eosinophils, Absolute 0.13 0.00 - 0.40 10*3/mm3    Basophils, Absolute 0.06 0.00 - 0.20 10*3/mm3    Immature Grans, Absolute 0.01 0.00 - 0.05 10*3/mm3    nRBC 0.0 0.0 - 0.2 /100 WBC   Ethanol    Collection Time: 04/13/25  2:15 PM    Specimen: Blood   Result Value Ref Range    Ethanol <10 0 - 10 mg/dL    Ethanol % <0.010 %   Green Top (Gel)    Collection Time: 04/13/25  2:15 PM   Result Value Ref Range    Extra Tube Hold for add-ons.    Lavender Top    Collection Time: 04/13/25  2:15 PM   Result Value Ref Range    Extra Tube hold for add-on    Light Blue Top    Collection Time: 04/13/25  2:15 PM   Result Value Ref Range    Extra Tube Hold for add-ons.    Urinalysis With Microscopic If Indicated (No Culture) - Urine, Clean Catch    Collection Time: 04/13/25  3:44 PM    Specimen:  Urine, Clean Catch   Result Value Ref Range    Color, UA Yellow Yellow, Straw    Appearance, UA Clear Clear    pH, UA 7.0 5.0 - 8.0    Specific Gravity, UA >=1.030 1.005 - 1.030    Glucose, UA Negative Negative    Ketones, UA Negative Negative    Bilirubin, UA Negative Negative    Blood, UA Negative Negative    Protein, UA 30 mg/dL (1+) (A) Negative    Leuk Esterase, UA Negative Negative    Nitrite, UA Negative Negative    Urobilinogen, UA 1.0 E.U./dL 0.2 - 1.0 E.U./dL   Urine Drug Screen - Urine, Clean Catch    Collection Time: 04/13/25  3:44 PM    Specimen: Urine, Clean Catch   Result Value Ref Range    THC, Screen, Urine Positive (A) Negative    Phencyclidine (PCP), Urine Negative Negative    Cocaine Screen, Urine Negative Negative    Methamphetamine, Ur Positive (A) Negative    Opiate Screen Negative Negative    Amphetamine Screen, Urine Positive (A) Negative    Benzodiazepine Screen, Urine Negative Negative    Tricyclic Antidepressants Screen Negative Negative    Methadone Screen, Urine Negative Negative    Barbiturates Screen, Urine Negative Negative    Oxycodone Screen, Urine Negative Negative    Buprenorphine, Screen, Urine Negative Negative   Fentanyl, Urine - Urine, Clean Catch    Collection Time: 04/13/25  3:44 PM    Specimen: Urine, Clean Catch   Result Value Ref Range    Fentanyl, Urine Negative Negative   Urinalysis, Microscopic Only - Urine, Clean Catch    Collection Time: 04/13/25  3:44 PM    Specimen: Urine, Clean Catch   Result Value Ref Range    RBC, UA 0-2 None Seen, 0-2 /HPF    WBC, UA 3-5 (A) None Seen, 0-2 /HPF    Bacteria, UA None Seen None Seen /HPF    Squamous Epithelial Cells, UA 3-6 (A) None Seen, 0-2 /HPF    Transitional Epithelial Cells, UA 0-2 0 - 2 /HPF    Hyaline Casts, UA 3-6 None Seen /LPF    Methodology Manual Light Microscopy     CT Head Without Contrast  Result Date: 4/13/2025   No acute process seen in the brain. No acute intracranial hemorrhage, mass, infarct, or edema. No  "fracture or foreign body. Clear paranasal sinuses and clear mastoid air cells.  This report was finalized on 4/13/2025 3:16 PM by Iam Sarabia MD.                                                      Medical Decision Making  35-year-old female patient with known past medical history significant for lupus, hepatitis C, anemia, history of substance abuse presents to [facility] ED with chief complaint of headache, elevated BP, and nosebleed.  Patient states that her nose was bleeding this morning when she woke up, although this has resolved at this time.  She states that headaches have been frequent and have been worse than usual.  She denies any visual disturbance, chest pain, shortness of breath, palpitations, or syncope.  She states that systolic BP was in the 80s last night and she took her home antihypertensive meds.  Patient states that she only takes her BP medications when her blood pressure is high.  She does not take them as prescribed.  Patient also reports recent hematuria which has also improved, although she states that urine remains cloudy.  She denies any significant dysuria/frequency/urgency.  She states overall, that she is \"just not felt good for a while\".    #CT head w/o contrast: Unremarkable.  No acute findings.  #Labs: Largely unremarkable.  Renal function intact.  Hemoglobin stable compared to prior values.  Infection markers not elevated.  #UA: Overall unremarkable. No concern for UTI. No blood.   #VS: Stable. BP mildly elevated systolic 130-140 and diastolic 100. Patient encouraged to take antihypertensive agents as prescribed by her physician.     The patient has been given very strict return precautions to return to the emergency department should there be any acute change or worsening of their condition.  I have explained my findings and the patient has expressed understanding to me.  I explained that the work-up performed in the ED has been based on the specific complaint and concern, as the " nature of emergency medicine is complaint driven and they understand that new symptoms may arise.  I have told them that, should there be any new symptoms, worsening or changing symptoms, a new work-up may be indicated that they are encouraged to return to the emergency department or promptly contact their primary care physician. We have employed a shared decision-making process as the discussion of their disposition.  The patient has been educated as to the nature of the visit, the tests and work-up performed and the findings from today's visit. At this time, there does not appear to be any acute emergent process that necessitates admission to the hospital, however, the patient understands that this can change unexpectedly. At this time, the patient is stable for discharge home and agrees to follow-up with his or her primary care physician in the next 24 to 48 hours or earlier should they be able to obtain an appointment.     The patient was counseled regarding diagnostic results and treatment plan and patient has indicated understanding of these instructions.     Problems Addressed:  Medical non-compliance: complicated acute illness or injury  Methamphetamine use: complicated acute illness or injury  Primary hypertension: complicated acute illness or injury    Amount and/or Complexity of Data Reviewed  Labs: ordered. Decision-making details documented in ED Course.  Radiology: ordered. Decision-making details documented in ED Course.      Final diagnoses:   Primary hypertension   Methamphetamine use   Medical non-compliance     ED Disposition  ED Disposition       ED Disposition   Discharge    Condition   Stable    Comment   --             Juwan Paredes MD  [address] [place] [id number] [phone]    Schedule an appointment as soon as possible for a visit in 1 day  Post ED visit evaluation         Medication List      No changes were made to your prescriptions during this visit.           June Sheffield, APRN  04/13/25 1609

## (undated) DEVICE — COR MINOR LITHOTOMY: Brand: MEDLINE INDUSTRIES, INC.

## (undated) DEVICE — ST COL BERKELY TBG

## (undated) DEVICE — HOSE BT TO BT VAC CURETTAGE SNGL PT USE EA/10

## (undated) DEVICE — GLV SURG PREMIERPRO MIC LTX PF SZ7 BRN

## (undated) DEVICE — GLV SURG BIOGEL LTX PF 7

## (undated) DEVICE — UNDERGLV SURG BIOGEL INDICATOR LTX PF 7

## (undated) DEVICE — TBG W FLTR FOR BERKELEY SYSTEM

## (undated) DEVICE — VACURETTE CRV RIGD 10MM DISP

## (undated) DEVICE — SYS FLUID MGMT HYST SAFETOUCH